# Patient Record
Sex: MALE | Race: WHITE | NOT HISPANIC OR LATINO | Employment: OTHER | URBAN - METROPOLITAN AREA
[De-identification: names, ages, dates, MRNs, and addresses within clinical notes are randomized per-mention and may not be internally consistent; named-entity substitution may affect disease eponyms.]

---

## 2024-06-21 ENCOUNTER — PRE-ADMISSION TESTING (OUTPATIENT)
Dept: PREADMISSION TESTING | Facility: HOSPITAL | Age: 69
End: 2024-06-21
Payer: MEDICARE

## 2024-06-21 VITALS — HEIGHT: 70 IN | WEIGHT: 212.7 LBS | BODY MASS INDEX: 30.45 KG/M2

## 2024-06-21 LAB
ALBUMIN SERPL-MCNC: 4.4 G/DL (ref 3.5–5.2)
ALBUMIN/GLOB SERPL: 1.6 G/DL
ALP SERPL-CCNC: 48 U/L (ref 39–117)
ALT SERPL W P-5'-P-CCNC: 18 U/L (ref 1–41)
ANION GAP SERPL CALCULATED.3IONS-SCNC: 9 MMOL/L (ref 5–15)
APTT PPP: 27.6 SECONDS (ref 22–39)
AST SERPL-CCNC: 28 U/L (ref 1–40)
BASOPHILS # BLD AUTO: 0.03 10*3/MM3 (ref 0–0.2)
BASOPHILS NFR BLD AUTO: 0.4 % (ref 0–1.5)
BILIRUB SERPL-MCNC: 1.1 MG/DL (ref 0–1.2)
BUN SERPL-MCNC: 13 MG/DL (ref 8–23)
BUN/CREAT SERPL: 14.6 (ref 7–25)
CALCIUM SPEC-SCNC: 9.4 MG/DL (ref 8.6–10.5)
CHLORIDE SERPL-SCNC: 103 MMOL/L (ref 98–107)
CO2 SERPL-SCNC: 28 MMOL/L (ref 22–29)
CREAT SERPL-MCNC: 0.89 MG/DL (ref 0.76–1.27)
DEPRECATED RDW RBC AUTO: 44.4 FL (ref 37–54)
EGFRCR SERPLBLD CKD-EPI 2021: 93.3 ML/MIN/1.73
EOSINOPHIL # BLD AUTO: 0.08 10*3/MM3 (ref 0–0.4)
EOSINOPHIL NFR BLD AUTO: 1 % (ref 0.3–6.2)
ERYTHROCYTE [DISTWIDTH] IN BLOOD BY AUTOMATED COUNT: 12.8 % (ref 12.3–15.4)
GLOBULIN UR ELPH-MCNC: 2.8 GM/DL
GLUCOSE SERPL-MCNC: 88 MG/DL (ref 65–99)
HBA1C MFR BLD: 5.6 % (ref 4.8–5.6)
HCT VFR BLD AUTO: 44.5 % (ref 37.5–51)
HGB BLD-MCNC: 14.3 G/DL (ref 13–17.7)
IMM GRANULOCYTES # BLD AUTO: 0.01 10*3/MM3 (ref 0–0.05)
IMM GRANULOCYTES NFR BLD AUTO: 0.1 % (ref 0–0.5)
INR PPP: 0.99 (ref 0.89–1.12)
LYMPHOCYTES # BLD AUTO: 2.06 10*3/MM3 (ref 0.7–3.1)
LYMPHOCYTES NFR BLD AUTO: 26.8 % (ref 19.6–45.3)
MCH RBC QN AUTO: 30.4 PG (ref 26.6–33)
MCHC RBC AUTO-ENTMCNC: 32.1 G/DL (ref 31.5–35.7)
MCV RBC AUTO: 94.7 FL (ref 79–97)
MONOCYTES # BLD AUTO: 0.72 10*3/MM3 (ref 0.1–0.9)
MONOCYTES NFR BLD AUTO: 9.4 % (ref 5–12)
NEUTROPHILS NFR BLD AUTO: 4.79 10*3/MM3 (ref 1.7–7)
NEUTROPHILS NFR BLD AUTO: 62.3 % (ref 42.7–76)
NRBC BLD AUTO-RTO: 0 /100 WBC (ref 0–0.2)
PLATELET # BLD AUTO: 255 10*3/MM3 (ref 140–450)
PMV BLD AUTO: 11 FL (ref 6–12)
POTASSIUM SERPL-SCNC: 4.2 MMOL/L (ref 3.5–5.2)
PROT SERPL-MCNC: 7.2 G/DL (ref 6–8.5)
PROTHROMBIN TIME: 13.2 SECONDS (ref 12.2–14.5)
QT INTERVAL: 388 MS
QTC INTERVAL: 393 MS
RBC # BLD AUTO: 4.7 10*6/MM3 (ref 4.14–5.8)
SODIUM SERPL-SCNC: 140 MMOL/L (ref 136–145)
WBC NRBC COR # BLD AUTO: 7.69 10*3/MM3 (ref 3.4–10.8)

## 2024-06-21 PROCEDURE — 85730 THROMBOPLASTIN TIME PARTIAL: CPT

## 2024-06-21 PROCEDURE — 85025 COMPLETE CBC W/AUTO DIFF WBC: CPT

## 2024-06-21 PROCEDURE — 36415 COLL VENOUS BLD VENIPUNCTURE: CPT

## 2024-06-21 PROCEDURE — 85610 PROTHROMBIN TIME: CPT

## 2024-06-21 PROCEDURE — 83036 HEMOGLOBIN GLYCOSYLATED A1C: CPT

## 2024-06-21 PROCEDURE — 80053 COMPREHEN METABOLIC PANEL: CPT

## 2024-06-21 PROCEDURE — 93010 ELECTROCARDIOGRAM REPORT: CPT | Performed by: INTERNAL MEDICINE

## 2024-06-21 PROCEDURE — 93005 ELECTROCARDIOGRAM TRACING: CPT

## 2024-06-21 RX ORDER — LISINOPRIL 20 MG/1
20 TABLET ORAL DAILY
COMMUNITY
Start: 2015-05-19

## 2024-06-21 RX ORDER — ATORVASTATIN CALCIUM 10 MG/1
10 TABLET, FILM COATED ORAL DAILY
COMMUNITY
Start: 2024-02-27

## 2024-06-21 RX ORDER — ASPIRIN 81 MG/1
TABLET ORAL
COMMUNITY
Start: 2015-05-19

## 2024-06-21 RX ORDER — ALBUTEROL SULFATE 90 UG/1
2 AEROSOL, METERED RESPIRATORY (INHALATION) EVERY 4 HOURS PRN
COMMUNITY
Start: 2024-02-27 | End: 2025-02-26

## 2024-06-21 RX ORDER — MULTIPLE VITAMINS W/ MINERALS TAB 9MG-400MCG
1 TAB ORAL DAILY
COMMUNITY

## 2024-06-21 NOTE — PAT
An arrival time for procedure was not provided during PAT visit. If patient had any questions or concerns about their arrival time, they were instructed to contact their surgeon/physician.  Additionally, if the patient referred to an arrival time that was acquired from their my chart account, patient was encouraged to verify that time with their surgeon/physician. Arrival times are NOT provided in Pre Admission Testing Department.    Patient viewed general PAT education video as instructed in their preoperative information received from their surgeon.  Patient stated the general PAT education video was viewed in its entirety and survey completed.  Copies of PAT general education handouts (Incentive Spirometry, Meds to Beds Program, Patient Belongings, Pre-op skin preparation instructions, Blood Glucose testing, Visitor policy, Surgery FAQ, Code H) distributed to patient if not printed. Education related to the PAT pass and skin preparation for surgery (if applicable) completed in PAT as a reinforcement to PAT education video. Patient instructed to return PAT pass provided today as well as completed skin preparation sheet (if applicable) on the day of procedure.     Additionally if patient had not viewed video yet but intended to view it at home or in our waiting area, then referred them to the handout with QR code/link provided during PAT visit.  Instructed patient to complete survey after viewing the video in its entirety.  Encouraged patient/family to read PAT general education handouts thoroughly and notify PAT staff with any questions or concerns. Patient verbalized understanding of all information and priority content.    Per Anesthesia Request, patient instructed not to take their ACE/ARB medications on the AM of surgery.    Patient's surgeon called in a prescription for Benzol Peroxide 5% wash to [facility].  Patient instructed to  from [facility] pharmacy that was submitted electronically.  Verbal  and written instructions given regarding proper use of the Benzoyl Peroxide wash were provided to patient and/or paigelily during PAT visit. Patient/family also instructed to complete Benzol Peroxide checklist and return it to Pre-op on the day of surgery.  Patient and/or family verbalized understanding.      Additionally, reinforced with patient to acquire this prescription from the [facility] retail pharmacy before leaving the hospital after PAT visit due to the potential unavailability at local pharmacies.    Patient instructed to drink 20 ounces of Gatorade or Gatorlyte (if diabetic) and it needs to be completed 1 hour (for Main OR patients) or 2 hours (scheduled  section & [facility] patients) before given arrival time for procedure (NO RED Gatorade and NO Gatorade Zero).    Patient verbalized understanding.    Patient denies any current skin issues.     EKG IN CHART    [employer] (by [employer]) pain pump patient informational handout given to patient.  Instructed patient to watch [employer] Patient Education Video regarding Peripheral Nerve Catheter that will be in place for upcoming surgery unless contraindicated. The video can be accessed using QR code noted on handout.  Patient agreed to watch video.  Stressed to patient to call [facility]  if patient has any questions or concerns after discharge.

## 2024-06-27 ENCOUNTER — ANESTHESIA EVENT (OUTPATIENT)
Dept: PERIOP | Facility: HOSPITAL | Age: 69
End: 2024-06-27
Payer: MEDICARE

## 2024-06-27 RX ORDER — FAMOTIDINE 10 MG/ML
20 INJECTION, SOLUTION INTRAVENOUS ONCE
Status: CANCELLED | OUTPATIENT
Start: 2024-06-27 | End: 2024-06-27

## 2024-06-28 ENCOUNTER — APPOINTMENT (OUTPATIENT)
Dept: GENERAL RADIOLOGY | Facility: HOSPITAL | Age: 69
End: 2024-06-28
Payer: MEDICARE

## 2024-06-28 ENCOUNTER — ANESTHESIA EVENT CONVERTED (OUTPATIENT)
Dept: ANESTHESIOLOGY | Facility: HOSPITAL | Age: 69
End: 2024-06-28
Payer: MEDICARE

## 2024-06-28 ENCOUNTER — HOSPITAL ENCOUNTER (OUTPATIENT)
Facility: HOSPITAL | Age: 69
Discharge: HOME | End: 2024-06-30
Attending: ORTHOPAEDIC SURGERY | Admitting: ORTHOPAEDIC SURGERY
Payer: MEDICARE

## 2024-06-28 ENCOUNTER — ANESTHESIA (OUTPATIENT)
Dept: PERIOP | Facility: HOSPITAL | Age: 69
End: 2024-06-28
Payer: MEDICARE

## 2024-06-28 DIAGNOSIS — Z96.611 STATUS POST REVERSE TOTAL REPLACEMENT OF RIGHT SHOULDER: Primary | ICD-10-CM

## 2024-06-28 PROBLEM — M75.100 ROTATOR CUFF TEAR: Status: ACTIVE | Noted: 2024-06-28

## 2024-06-28 PROBLEM — I10 HTN (HYPERTENSION): Status: ACTIVE | Noted: 2024-06-28

## 2024-06-28 PROBLEM — E78.5 HYPERLIPIDEMIA: Status: ACTIVE | Noted: 2024-06-28

## 2024-06-28 PROBLEM — M25.511 RIGHT SHOULDER PAIN: Status: ACTIVE | Noted: 2024-06-28

## 2024-06-28 PROCEDURE — 25010000002 DEXAMETHASONE PER 1 MG

## 2024-06-28 PROCEDURE — C1776 JOINT DEVICE (IMPLANTABLE): HCPCS | Performed by: ORTHOPAEDIC SURGERY

## 2024-06-28 PROCEDURE — A9270 NON-COVERED ITEM OR SERVICE: HCPCS | Performed by: ORTHOPAEDIC SURGERY

## 2024-06-28 PROCEDURE — A9270 NON-COVERED ITEM OR SERVICE: HCPCS | Performed by: INTERNAL MEDICINE

## 2024-06-28 PROCEDURE — 97530 THERAPEUTIC ACTIVITIES: CPT

## 2024-06-28 PROCEDURE — 73020 X-RAY EXAM OF SHOULDER: CPT

## 2024-06-28 PROCEDURE — 25010000002 ONDANSETRON PER 1 MG

## 2024-06-28 PROCEDURE — 25010000002 PROPOFOL 10 MG/ML EMULSION

## 2024-06-28 PROCEDURE — 97166 OT EVAL MOD COMPLEX 45 MIN: CPT

## 2024-06-28 PROCEDURE — G0378 HOSPITAL OBSERVATION PER HR: HCPCS

## 2024-06-28 PROCEDURE — 63710000001 ATORVASTATIN 10 MG TABLET: Performed by: INTERNAL MEDICINE

## 2024-06-28 PROCEDURE — 25010000002 BUPIVACAINE (PF) 0.25 % SOLUTION: Performed by: NURSE ANESTHETIST, CERTIFIED REGISTERED

## 2024-06-28 PROCEDURE — 25010000002 SUGAMMADEX 200 MG/2ML SOLUTION

## 2024-06-28 PROCEDURE — 63710000001 OXYCODONE 5 MG TABLET: Performed by: ORTHOPAEDIC SURGERY

## 2024-06-28 PROCEDURE — 25010000002 FENTANYL CITRATE (PF) 100 MCG/2ML SOLUTION

## 2024-06-28 PROCEDURE — C1713 ANCHOR/SCREW BN/BN,TIS/BN: HCPCS | Performed by: ORTHOPAEDIC SURGERY

## 2024-06-28 PROCEDURE — 25010000002 DEXAMETHASONE SODIUM PHOSPHATE 10 MG/ML SOLUTION: Performed by: NURSE ANESTHETIST, CERTIFIED REGISTERED

## 2024-06-28 PROCEDURE — 63710000001 ACETAMINOPHEN 325 MG TABLET: Performed by: ORTHOPAEDIC SURGERY

## 2024-06-28 PROCEDURE — 63710000001 LISINOPRIL 20 MG TABLET: Performed by: INTERNAL MEDICINE

## 2024-06-28 PROCEDURE — 25010000002 CEFAZOLIN PER 500 MG: Performed by: ORTHOPAEDIC SURGERY

## 2024-06-28 PROCEDURE — 25010000002 ROPIVACAINE HCL-NACL 0.2-0.9 % SOLUTION: Performed by: ORTHOPAEDIC SURGERY

## 2024-06-28 PROCEDURE — 25010000002 FENTANYL CITRATE (PF) 50 MCG/ML SOLUTION: Performed by: NURSE ANESTHETIST, CERTIFIED REGISTERED

## 2024-06-28 PROCEDURE — 25810000003 LACTATED RINGERS PER 1000 ML: Performed by: ANESTHESIOLOGY

## 2024-06-28 PROCEDURE — L3670 SO ACRO/CLAV CAN WEB PRE OTS: HCPCS | Performed by: ORTHOPAEDIC SURGERY

## 2024-06-28 PROCEDURE — 25010000002 ROPIVACAINE HCL-NACL 0.2-0.9 % SOLUTION: Performed by: NURSE ANESTHETIST, CERTIFIED REGISTERED

## 2024-06-28 PROCEDURE — 25010000002 VANCOMYCIN 1 G RECONSTITUTED SOLUTION: Performed by: ORTHOPAEDIC SURGERY

## 2024-06-28 DEVICE — SCRW CENTRL GLEN UNIVERS REVERS 20MM: Type: IMPLANTABLE DEVICE | Site: SHOULDER | Status: FUNCTIONAL

## 2024-06-28 DEVICE — CUP SUT UNIVERS REVERS 39 PLS2 RT: Type: IMPLANTABLE DEVICE | Site: SHOULDER | Status: FUNCTIONAL

## 2024-06-28 DEVICE — SCRW NL GLEN UNIVERS REVERS PERIPH 4.5X36MM: Type: IMPLANTABLE DEVICE | Site: SHOULDER | Status: FUNCTIONAL

## 2024-06-28 DEVICE — GLENOSPHERE UNIVERS REVERS MODULAR L/24 39PLS4: Type: IMPLANTABLE DEVICE | Site: SHOULDER | Status: FUNCTIONAL

## 2024-06-28 DEVICE — STEM HUM/SHLDR UNIVERS REVERS APEX SZ9: Type: IMPLANTABLE DEVICE | Site: SHOULDER | Status: FUNCTIONAL

## 2024-06-28 DEVICE — IMPLANTABLE DEVICE: Type: IMPLANTABLE DEVICE | Site: SHOULDER | Status: FUNCTIONAL

## 2024-06-28 DEVICE — SCRW NL GLEN UNIVERS REVERS PERIPH 4.5X20MM: Type: IMPLANTABLE DEVICE | Site: SHOULDER | Status: FUNCTIONAL

## 2024-06-28 DEVICE — SCRW LK GLEN UNIVERS REVERS PERIPH 5.5X32MM: Type: IMPLANTABLE DEVICE | Site: SHOULDER | Status: FUNCTIONAL

## 2024-06-28 DEVICE — LINER HUM UNIVERS REVERS MD 39  PLS6MM: Type: IMPLANTABLE DEVICE | Site: SHOULDER | Status: FUNCTIONAL

## 2024-06-28 DEVICE — SCRW LK GLEN UNIVERS REVERS PERIPH 5.5X36MM: Type: IMPLANTABLE DEVICE | Site: SHOULDER | Status: FUNCTIONAL

## 2024-06-28 DEVICE — ABSORBABLE HEMOSTAT (OXIDIZED REGENERATED CELLULOSE)
Type: IMPLANTABLE DEVICE | Site: SHOULDER | Status: FUNCTIONAL
Brand: SURGICEL

## 2024-06-28 RX ORDER — ONDANSETRON 2 MG/ML
4 INJECTION INTRAMUSCULAR; INTRAVENOUS ONCE AS NEEDED
Status: DISCONTINUED | OUTPATIENT
Start: 2024-06-28 | End: 2024-06-28 | Stop reason: HOSPADM

## 2024-06-28 RX ORDER — PREGABALIN 75 MG/1
75 CAPSULE ORAL ONCE
Status: COMPLETED | OUTPATIENT
Start: 2024-06-28 | End: 2024-06-28

## 2024-06-28 RX ORDER — ACETAMINOPHEN 325 MG/1
650 TABLET ORAL EVERY 4 HOURS PRN
Status: DISCONTINUED | OUTPATIENT
Start: 2024-06-28 | End: 2024-06-30 | Stop reason: HOSPADM

## 2024-06-28 RX ORDER — SODIUM CHLORIDE 450 MG/100ML
50 INJECTION, SOLUTION INTRAVENOUS CONTINUOUS
Status: DISCONTINUED | OUTPATIENT
Start: 2024-06-28 | End: 2024-06-29

## 2024-06-28 RX ORDER — OXYCODONE HYDROCHLORIDE 5 MG/1
5 TABLET ORAL EVERY 4 HOURS PRN
Status: DISCONTINUED | OUTPATIENT
Start: 2024-06-28 | End: 2024-06-30 | Stop reason: HOSPADM

## 2024-06-28 RX ORDER — FAMOTIDINE 20 MG/1
20 TABLET, FILM COATED ORAL ONCE
Status: COMPLETED | OUTPATIENT
Start: 2024-06-28 | End: 2024-06-28

## 2024-06-28 RX ORDER — SODIUM CHLORIDE 9 MG/ML
40 INJECTION, SOLUTION INTRAVENOUS AS NEEDED
Status: DISCONTINUED | OUTPATIENT
Start: 2024-06-28 | End: 2024-06-28 | Stop reason: HOSPADM

## 2024-06-28 RX ORDER — CEFAZOLIN SODIUM 2 G/100ML
2000 INJECTION, SOLUTION INTRAVENOUS EVERY 8 HOURS
Qty: 200 ML | Refills: 0 | Status: DISCONTINUED | OUTPATIENT
Start: 2024-06-28 | End: 2024-06-28

## 2024-06-28 RX ORDER — ALBUTEROL SULFATE 2.5 MG/3ML
2.5 SOLUTION RESPIRATORY (INHALATION) EVERY 6 HOURS PRN
Status: DISCONTINUED | OUTPATIENT
Start: 2024-06-28 | End: 2024-06-30 | Stop reason: HOSPADM

## 2024-06-28 RX ORDER — FENTANYL CITRATE 50 UG/ML
INJECTION, SOLUTION INTRAMUSCULAR; INTRAVENOUS AS NEEDED
Status: DISCONTINUED | OUTPATIENT
Start: 2024-06-28 | End: 2024-06-28 | Stop reason: SURG

## 2024-06-28 RX ORDER — ONDANSETRON 2 MG/ML
INJECTION INTRAMUSCULAR; INTRAVENOUS AS NEEDED
Status: DISCONTINUED | OUTPATIENT
Start: 2024-06-28 | End: 2024-06-28 | Stop reason: SURG

## 2024-06-28 RX ORDER — ROPIVACAINE HYDROCHLORIDE 2 MG/ML
INJECTION, SOLUTION EPIDURAL; INFILTRATION; PERINEURAL CONTINUOUS
Status: DISCONTINUED | OUTPATIENT
Start: 2024-06-28 | End: 2024-06-30 | Stop reason: HOSPADM

## 2024-06-28 RX ORDER — ACETAMINOPHEN 500 MG
1000 TABLET ORAL ONCE
Status: COMPLETED | OUTPATIENT
Start: 2024-06-28 | End: 2024-06-28

## 2024-06-28 RX ORDER — SODIUM CHLORIDE 0.9 % (FLUSH) 0.9 %
3-10 SYRINGE (ML) INJECTION AS NEEDED
Status: DISCONTINUED | OUTPATIENT
Start: 2024-06-28 | End: 2024-06-28 | Stop reason: HOSPADM

## 2024-06-28 RX ORDER — DEXAMETHASONE SODIUM PHOSPHATE 4 MG/ML
INJECTION, SOLUTION INTRA-ARTICULAR; INTRALESIONAL; INTRAMUSCULAR; INTRAVENOUS; SOFT TISSUE AS NEEDED
Status: DISCONTINUED | OUTPATIENT
Start: 2024-06-28 | End: 2024-06-28 | Stop reason: SURG

## 2024-06-28 RX ORDER — PHENYLEPHRINE HCL IN 0.9% NACL 1 MG/10 ML
SYRINGE (ML) INTRAVENOUS AS NEEDED
Status: DISCONTINUED | OUTPATIENT
Start: 2024-06-28 | End: 2024-06-28 | Stop reason: SURG

## 2024-06-28 RX ORDER — TRANEXAMIC ACID 10 MG/ML
1000 INJECTION, SOLUTION INTRAVENOUS ONCE
Status: DISCONTINUED | OUTPATIENT
Start: 2024-06-28 | End: 2024-06-28 | Stop reason: HOSPADM

## 2024-06-28 RX ORDER — NALOXONE HCL 0.4 MG/ML
0.1 VIAL (ML) INJECTION
Status: DISCONTINUED | OUTPATIENT
Start: 2024-06-28 | End: 2024-06-30 | Stop reason: HOSPADM

## 2024-06-28 RX ORDER — IPRATROPIUM BROMIDE AND ALBUTEROL SULFATE 2.5; .5 MG/3ML; MG/3ML
3 SOLUTION RESPIRATORY (INHALATION) ONCE AS NEEDED
Status: DISCONTINUED | OUTPATIENT
Start: 2024-06-28 | End: 2024-06-28 | Stop reason: HOSPADM

## 2024-06-28 RX ORDER — SODIUM CHLORIDE 0.9 % (FLUSH) 0.9 %
3 SYRINGE (ML) INJECTION EVERY 12 HOURS SCHEDULED
Status: DISCONTINUED | OUTPATIENT
Start: 2024-06-28 | End: 2024-06-28 | Stop reason: HOSPADM

## 2024-06-28 RX ORDER — SODIUM CHLORIDE 0.9 % (FLUSH) 0.9 %
10 SYRINGE (ML) INJECTION EVERY 12 HOURS SCHEDULED
Status: DISCONTINUED | OUTPATIENT
Start: 2024-06-28 | End: 2024-06-28 | Stop reason: HOSPADM

## 2024-06-28 RX ORDER — DEXAMETHASONE SODIUM PHOSPHATE 10 MG/ML
INJECTION, SOLUTION INTRAMUSCULAR; INTRAVENOUS
Status: COMPLETED | OUTPATIENT
Start: 2024-06-28 | End: 2024-06-28

## 2024-06-28 RX ORDER — PROPOFOL 10 MG/ML
VIAL (ML) INTRAVENOUS AS NEEDED
Status: DISCONTINUED | OUTPATIENT
Start: 2024-06-28 | End: 2024-06-28 | Stop reason: SURG

## 2024-06-28 RX ORDER — LIDOCAINE HYDROCHLORIDE 10 MG/ML
INJECTION, SOLUTION EPIDURAL; INFILTRATION; INTRACAUDAL; PERINEURAL AS NEEDED
Status: DISCONTINUED | OUTPATIENT
Start: 2024-06-28 | End: 2024-06-28 | Stop reason: SURG

## 2024-06-28 RX ORDER — ROCURONIUM BROMIDE 10 MG/ML
INJECTION, SOLUTION INTRAVENOUS AS NEEDED
Status: DISCONTINUED | OUTPATIENT
Start: 2024-06-28 | End: 2024-06-28 | Stop reason: SURG

## 2024-06-28 RX ORDER — EPHEDRINE SULFATE 50 MG/ML
INJECTION INTRAVENOUS AS NEEDED
Status: DISCONTINUED | OUTPATIENT
Start: 2024-06-28 | End: 2024-06-28 | Stop reason: SURG

## 2024-06-28 RX ORDER — HYDRALAZINE HYDROCHLORIDE 20 MG/ML
5 INJECTION INTRAMUSCULAR; INTRAVENOUS
Status: DISCONTINUED | OUTPATIENT
Start: 2024-06-28 | End: 2024-06-28 | Stop reason: HOSPADM

## 2024-06-28 RX ORDER — ONDANSETRON 4 MG/1
4 TABLET, ORALLY DISINTEGRATING ORAL EVERY 6 HOURS PRN
Status: DISCONTINUED | OUTPATIENT
Start: 2024-06-28 | End: 2024-06-30 | Stop reason: HOSPADM

## 2024-06-28 RX ORDER — ONDANSETRON 2 MG/ML
4 INJECTION INTRAMUSCULAR; INTRAVENOUS EVERY 6 HOURS PRN
Status: DISCONTINUED | OUTPATIENT
Start: 2024-06-28 | End: 2024-06-30 | Stop reason: HOSPADM

## 2024-06-28 RX ORDER — BUPIVACAINE HYDROCHLORIDE 2.5 MG/ML
INJECTION, SOLUTION EPIDURAL; INFILTRATION; INTRACAUDAL
Status: COMPLETED | OUTPATIENT
Start: 2024-06-28 | End: 2024-06-28

## 2024-06-28 RX ORDER — VANCOMYCIN HYDROCHLORIDE 1 G/20ML
INJECTION, POWDER, LYOPHILIZED, FOR SOLUTION INTRAVENOUS AS NEEDED
Status: DISCONTINUED | OUTPATIENT
Start: 2024-06-28 | End: 2024-06-28 | Stop reason: HOSPADM

## 2024-06-28 RX ORDER — ACETAMINOPHEN 650 MG/1
650 SUPPOSITORY RECTAL EVERY 4 HOURS PRN
Status: DISCONTINUED | OUTPATIENT
Start: 2024-06-28 | End: 2024-06-30 | Stop reason: HOSPADM

## 2024-06-28 RX ORDER — LISINOPRIL 20 MG/1
20 TABLET ORAL DAILY
Status: DISCONTINUED | OUTPATIENT
Start: 2024-06-28 | End: 2024-06-29

## 2024-06-28 RX ORDER — SODIUM CHLORIDE, SODIUM LACTATE, POTASSIUM CHLORIDE, CALCIUM CHLORIDE 600; 310; 30; 20 MG/100ML; MG/100ML; MG/100ML; MG/100ML
9 INJECTION, SOLUTION INTRAVENOUS CONTINUOUS
Status: DISCONTINUED | OUTPATIENT
Start: 2024-06-28 | End: 2024-06-28

## 2024-06-28 RX ORDER — DROPERIDOL 2.5 MG/ML
0.62 INJECTION, SOLUTION INTRAMUSCULAR; INTRAVENOUS
Status: DISCONTINUED | OUTPATIENT
Start: 2024-06-28 | End: 2024-06-28 | Stop reason: HOSPADM

## 2024-06-28 RX ORDER — TRANEXAMIC ACID 10 MG/ML
1000 INJECTION, SOLUTION INTRAVENOUS ONCE
Status: COMPLETED | OUTPATIENT
Start: 2024-06-28 | End: 2024-06-28

## 2024-06-28 RX ORDER — LABETALOL HYDROCHLORIDE 5 MG/ML
5 INJECTION, SOLUTION INTRAVENOUS
Status: DISCONTINUED | OUTPATIENT
Start: 2024-06-28 | End: 2024-06-28 | Stop reason: HOSPADM

## 2024-06-28 RX ORDER — FENTANYL CITRATE 50 UG/ML
INJECTION, SOLUTION INTRAMUSCULAR; INTRAVENOUS
Status: COMPLETED | OUTPATIENT
Start: 2024-06-28 | End: 2024-06-28

## 2024-06-28 RX ORDER — HYDROMORPHONE HYDROCHLORIDE 1 MG/ML
0.5 INJECTION, SOLUTION INTRAMUSCULAR; INTRAVENOUS; SUBCUTANEOUS
Status: DISCONTINUED | OUTPATIENT
Start: 2024-06-28 | End: 2024-06-28 | Stop reason: SDUPTHER

## 2024-06-28 RX ORDER — ATORVASTATIN CALCIUM 10 MG/1
10 TABLET, FILM COATED ORAL NIGHTLY
Status: DISCONTINUED | OUTPATIENT
Start: 2024-06-28 | End: 2024-06-30 | Stop reason: HOSPADM

## 2024-06-28 RX ORDER — MIDAZOLAM HYDROCHLORIDE 1 MG/ML
0.5 INJECTION INTRAMUSCULAR; INTRAVENOUS
Status: DISCONTINUED | OUTPATIENT
Start: 2024-06-28 | End: 2024-06-28 | Stop reason: HOSPADM

## 2024-06-28 RX ORDER — LABETALOL HYDROCHLORIDE 5 MG/ML
10 INJECTION, SOLUTION INTRAVENOUS EVERY 4 HOURS PRN
Status: DISCONTINUED | OUTPATIENT
Start: 2024-06-28 | End: 2024-06-30 | Stop reason: HOSPADM

## 2024-06-28 RX ORDER — FENTANYL CITRATE 50 UG/ML
50 INJECTION, SOLUTION INTRAMUSCULAR; INTRAVENOUS
Status: DISCONTINUED | OUTPATIENT
Start: 2024-06-28 | End: 2024-06-28 | Stop reason: HOSPADM

## 2024-06-28 RX ORDER — LIDOCAINE HYDROCHLORIDE 10 MG/ML
0.5 INJECTION, SOLUTION EPIDURAL; INFILTRATION; INTRACAUDAL; PERINEURAL ONCE AS NEEDED
Status: COMPLETED | OUTPATIENT
Start: 2024-06-28 | End: 2024-06-28

## 2024-06-28 RX ORDER — SODIUM CHLORIDE 0.9 % (FLUSH) 0.9 %
10 SYRINGE (ML) INJECTION AS NEEDED
Status: DISCONTINUED | OUTPATIENT
Start: 2024-06-28 | End: 2024-06-28 | Stop reason: HOSPADM

## 2024-06-28 RX ORDER — NALOXONE HCL 0.4 MG/ML
0.4 VIAL (ML) INJECTION AS NEEDED
Status: DISCONTINUED | OUTPATIENT
Start: 2024-06-28 | End: 2024-06-28 | Stop reason: HOSPADM

## 2024-06-28 RX ORDER — DROPERIDOL 2.5 MG/ML
0.62 INJECTION, SOLUTION INTRAMUSCULAR; INTRAVENOUS ONCE AS NEEDED
Status: DISCONTINUED | OUTPATIENT
Start: 2024-06-28 | End: 2024-06-28 | Stop reason: HOSPADM

## 2024-06-28 RX ADMIN — EPHEDRINE SULFATE 10 MG: 50 INJECTION INTRAVENOUS at 10:28

## 2024-06-28 RX ADMIN — FAMOTIDINE 20 MG: 20 TABLET, FILM COATED ORAL at 08:25

## 2024-06-28 RX ADMIN — Medication 100 MCG: at 10:19

## 2024-06-28 RX ADMIN — SODIUM CHLORIDE, POTASSIUM CHLORIDE, SODIUM LACTATE AND CALCIUM CHLORIDE 9 ML/HR: 600; 310; 30; 20 INJECTION, SOLUTION INTRAVENOUS at 08:35

## 2024-06-28 RX ADMIN — BUPIVACAINE HYDROCHLORIDE 15 ML: 2.5 INJECTION, SOLUTION EPIDURAL; INFILTRATION; INTRACAUDAL; PERINEURAL at 09:06

## 2024-06-28 RX ADMIN — ATORVASTATIN CALCIUM 10 MG: 10 TABLET, FILM COATED ORAL at 20:37

## 2024-06-28 RX ADMIN — SUGAMMADEX 200 MG: 100 INJECTION, SOLUTION INTRAVENOUS at 11:17

## 2024-06-28 RX ADMIN — SODIUM CHLORIDE 2000 MG: 900 INJECTION INTRAVENOUS at 10:20

## 2024-06-28 RX ADMIN — LIDOCAINE HYDROCHLORIDE 100 MG: 10 INJECTION, SOLUTION EPIDURAL; INFILTRATION; INTRACAUDAL; PERINEURAL at 10:12

## 2024-06-28 RX ADMIN — PROPOFOL 30 MG: 10 INJECTION, EMULSION INTRAVENOUS at 11:22

## 2024-06-28 RX ADMIN — PROPOFOL 200 MG: 10 INJECTION, EMULSION INTRAVENOUS at 10:12

## 2024-06-28 RX ADMIN — ACETAMINOPHEN 650 MG: 325 TABLET ORAL at 15:15

## 2024-06-28 RX ADMIN — TRANEXAMIC ACID 1000 MG: 10 INJECTION, SOLUTION INTRAVENOUS at 11:09

## 2024-06-28 RX ADMIN — ONDANSETRON 4 MG: 2 INJECTION INTRAMUSCULAR; INTRAVENOUS at 11:09

## 2024-06-28 RX ADMIN — OXYCODONE HYDROCHLORIDE 5 MG: 5 TABLET ORAL at 20:37

## 2024-06-28 RX ADMIN — ACETAMINOPHEN 1000 MG: 500 TABLET ORAL at 08:25

## 2024-06-28 RX ADMIN — FENTANYL CITRATE 100 MCG: 50 INJECTION, SOLUTION INTRAMUSCULAR; INTRAVENOUS at 10:12

## 2024-06-28 RX ADMIN — PREGABALIN 75 MG: 75 CAPSULE ORAL at 08:25

## 2024-06-28 RX ADMIN — LISINOPRIL 20 MG: 20 TABLET ORAL at 15:15

## 2024-06-28 RX ADMIN — EPHEDRINE SULFATE 10 MG: 50 INJECTION INTRAVENOUS at 10:24

## 2024-06-28 RX ADMIN — LIDOCAINE HYDROCHLORIDE 0.5 ML: 10 INJECTION, SOLUTION EPIDURAL; INFILTRATION; INTRACAUDAL; PERINEURAL at 08:25

## 2024-06-28 RX ADMIN — SODIUM CHLORIDE 2000 MG: 900 INJECTION INTRAVENOUS at 18:26

## 2024-06-28 RX ADMIN — DEXAMETHASONE SODIUM PHOSPHATE 2 MG: 10 INJECTION, SOLUTION INTRAMUSCULAR; INTRAVENOUS at 09:10

## 2024-06-28 RX ADMIN — TRANEXAMIC ACID 1000 MG: 10 INJECTION, SOLUTION INTRAVENOUS at 10:21

## 2024-06-28 RX ADMIN — ROCURONIUM BROMIDE 80 MG: 10 INJECTION INTRAVENOUS at 10:13

## 2024-06-28 RX ADMIN — SODIUM CHLORIDE 50 ML/HR: 4.5 INJECTION, SOLUTION INTRAVENOUS at 15:16

## 2024-06-28 RX ADMIN — Medication 1000 MG: at 11:45

## 2024-06-28 RX ADMIN — Medication 100 MCG: at 10:58

## 2024-06-28 RX ADMIN — EPHEDRINE SULFATE 10 MG: 50 INJECTION INTRAVENOUS at 10:19

## 2024-06-28 RX ADMIN — ACETAMINOPHEN 650 MG: 325 TABLET ORAL at 20:37

## 2024-06-28 RX ADMIN — Medication 100 MCG: at 10:49

## 2024-06-28 RX ADMIN — FENTANYL CITRATE 100 MCG: 50 INJECTION, SOLUTION INTRAMUSCULAR; INTRAVENOUS at 08:59

## 2024-06-28 RX ADMIN — SODIUM CHLORIDE, POTASSIUM CHLORIDE, SODIUM LACTATE AND CALCIUM CHLORIDE: 600; 310; 30; 20 INJECTION, SOLUTION INTRAVENOUS at 11:23

## 2024-06-28 RX ADMIN — BUPIVACAINE HYDROCHLORIDE 30 ML: 2.5 INJECTION, SOLUTION EPIDURAL; INFILTRATION; INTRACAUDAL at 09:10

## 2024-06-28 RX ADMIN — DEXAMETHASONE SODIUM PHOSPHATE 4 MG: 4 INJECTION INTRA-ARTICULAR; INTRALESIONAL; INTRAMUSCULAR; INTRAVENOUS; SOFT TISSUE at 10:17

## 2024-06-28 NOTE — ANESTHESIA PROCEDURE NOTES
Airway  Urgency: elective    Date/Time: 6/28/2024 10:14 AM  Airway not difficult    General Information and Staff    Patient location during procedure: OR  CRNA/CAA: Gary Zambrano CRNA    Indications and Patient Condition  Indications for airway management: airway protection    Preoxygenated: yes  MILS not maintained throughout  Mask difficulty assessment: 2 - vent by mask + OA or adjuvant +/- NMBA    Final Airway Details  Final airway type: endotracheal airway      Successful airway: ETT  Cuffed: yes   Successful intubation technique: video laryngoscopy  Endotracheal tube insertion site: oral  Blade: Amina  Blade size: 3  ETT size (mm): 7.5  Cormack-Lehane Classification: grade I - full view of glottis  Placement verified by: chest auscultation and capnometry   Cuff volume (mL): 10  Measured from: lips  ETT/EBT  to lips (cm): 21  Number of attempts at approach: 1  Assessment: lips, teeth, and gum same as pre-op and atraumatic intubation    Additional Comments  Negative epigastric sounds, Breath sound equal bilaterally with symmetric chest rise and fall

## 2024-06-28 NOTE — THERAPY EVALUATION
Patient Name: George Pickard  : 1955    MRN: [id number]                              Today's Date: 2024       Admit Date: 2024    Visit Dx: No diagnosis found.  Patient Active Problem List   Diagnosis    Rotator cuff tear    Right shoulder pain    HTN (hypertension)    Hyperlipidemia    Status post reverse total replacement of right shoulder     Past Medical History:   Diagnosis Date    Arthritis     Hyperlipidemia     Hypertension     ROGE (obstructive sleep apnea)     does not use cpap     Past Surgical History:   Procedure Laterality Date    COLECTOMY PARTIAL / TOTAL      COLONOSCOPY      ENDOSCOPY      ROTATOR CUFF REPAIR Bilateral     R-, L-      General Information       Row Name 24 1523          OT Time and Intention    Document Type evaluation;therapy note (daily note)  -TB     Mode of Treatment occupational therapy;individual therapy  -TB       Row Name 24 1523          General Information    Patient Profile Reviewed yes  -TB     Prior Level of Function independent:;all household mobility;community mobility;ADL's;driving;shopping;using stairs  No AD or falls  -TB     Existing Precautions/Restrictions right;shoulder;non-weight bearing;other (see comments)  s/p R rTSA with NWB precautions, Sling with abduction pillow, IS nerve catheter  -TB     Barriers to Rehab none identified  -TB       Row Name 24 1523          Occupational Profile    Reason for Services/Referral (Occupational Profile) Occupational decline  -TB     Environmental Supports and Barriers (Occupational Profile) Pt lives with Daughter and grandchildren in a double wide trailer with 3-4 steps to enter, rails on both sides. Walk-in shower. Comfort ht commodes. No AD or falls prior. Pt reports Independent with BADLs at baseline.  -TB       Row Name 24 1523          Living Environment    People in Home child(judy), adult;grandchild(judy)  -TB       Row Name 24 1523          Home Main  Entrance    Number of Stairs, Main Entrance four  -TB     Stair Railings, Main Entrance railings safe and in good condition  -TB       Row Name 06/28/24 1523          Stairs Within Home, Primary    Number of Stairs, Within Home, Primary none  -TB       Row Name 06/28/24 1523          Cognition    Orientation Status (Cognition) oriented x 4  -TB       Row Name 06/28/24 1523          Safety Issues, Functional Mobility    Safety Issues Affecting Function (Mobility) insight into deficits/self-awareness;awareness of need for assistance;safety precaution awareness;safety precautions follow-through/compliance;sequencing abilities  -TB     Impairments Affecting Function (Mobility) strength;sensation/sensory awareness;range of motion (ROM)  -TB     Comment, Safety Issues/Impairments (Mobility) Pt passed the OT mobility screen. Up in room and hallway with L HHA, progressing to SBA. Able to ascend/descend 4 steps with CGA. RA sats stable >90% on exertion. No PT needed.  -TB               User Key  (r) = Recorded By, (t) = Taken By, (c) = Cosigned By      Initials Name Provider Type    TB Diana Delarosa, OT Occupational Therapist                     Mobility/ADL's       Row Name 06/28/24 1531          Bed Mobility    Bed Mobility supine-sit;scooting/bridging  -TB     Scooting/Bridging [place] (Bed Mobility) standby assist;verbal cues  -TB     Supine-Sit [place] (Bed Mobility) standby assist;verbal cues  -TB     Bed Mobility, Safety Issues decreased use of arms for pushing/pulling  -TB     Assistive Device (Bed Mobility) head of bed elevated;bed rails  -TB     Comment, (Bed Mobility) Pt able to transition to EOB sitting with cues. Education and Assist to manage nerve catheter. Pt able to maintain LUE NWB precautions.  -TB       Row Name 06/28/24 1531          Transfers    Transfers sit-stand transfer;stand-sit transfer;bed-chair transfer  -TB     Comment, (Transfers) Education and cues for hand placement.  Assist to manage nerve catheter.  -TB       Row Name 06/28/24 1531          Bed-Chair Transfer    Bed-Chair [place] (Transfers) standby assist;verbal cues  -TB       Row Name 06/28/24 1531          Sit-Stand Transfer    Sit-Stand [place] (Transfers) standby assist;verbal cues  -TB       Row Name 06/28/24 1531          Stand-Sit Transfer    Stand-Sit [place] (Transfers) standby assist;verbal cues  -TB       Row Name 06/28/24 1531          Functional Mobility    Functional Mobility- Ind. Level contact guard assist;standby assist  -TB     Functional Mobility-Distance (Feet) 350  -TB     Functional Mobility-Maintain WBing able to maintain weight bearing status  -TB     Functional Mobility- Comment Pt passed the OT mobility screen. Up in room and hallway with L HHA, progressing to SBA. Able to ascend/descend 4 steps with CGA. RA sats stable >90% on exertion. No PT needed.  -TB     Patient was able to Ambulate yes  -       Row Name 06/28/24 1531          Activities of Daily Living    BADL Assessment/Intervention bathing;upper body dressing;feeding  -       Row Name 06/28/24 1531          Mobility    Extremity Weight-bearing Status right upper extremity  -TB     Right Upper Extremity (Weight-bearing Status) non weight-bearing (NWB)   -       Row Name 06/28/24 1531          Bathing Assessment/Intervention    [place] Level (Bathing) maximum assist (25% patient effort);upper body;bathing skills  -TB     Position (Bathing) edge of bed sitting  -TB     Comment, (Bathing) Patient educated on shoulder precautions with axilla care. Patient educated that nerve catheter cannot get wet. Pt's brother present for teaching.  -TB       Row Name 06/28/24 1531          Upper Body Dressing Assessment/Training    [place] Level (Upper Body Dressing) don;front opening garment;maximum assist (25% patient effort);verbal cues  sling mgmt/proper fit  -TB     Position (Upper Body Dressing) edge of bed sitting  -TB      Comment, (Upper Body Dressing) Patient educated on shoulder precautions with ADL retraining and sling management. Patient educated on nerve catheter care to avoid dislodgement. Pt's brother present for teaching.  -       Row Name 06/28/24 1531          Self-Feeding Assessment/Training    [place] Level (Feeding) independent;finger foods;liquids to mouth  -     Position (Self-Feeding) sitting up in bed  -               User Key  (r) = Recorded By, (t) = Taken By, (c) = Cosigned By      Initials Name Provider Type     Diana Delarosa, OT Occupational Therapist                   Obj/Interventions       Row Name 06/28/24 1535          Sensory Assessment (Somatosensory)    Sensory Assessment (Somatosensory) right UE  -     Right UE Sensory Assessment general sensation;impaired  -     Sensory Subjective Reports numbness  -     Sensory Assessment RUE IS nerve catheter infusing  -       Row Name 06/28/24 1535          Sensory Interventions    Sensory Re-education (Sensation) visual observation of sensory input  -[facility] Name 06/28/24 1535          Vision Assessment/Intervention    Visual Impairment/Limitations WFL  -[facility] Name 06/28/24 1535          Range of Motion Comprehensive    Comment, General Range of Motion h/o L RCT surgery with good recovery, AROM WFL. R hand, wrist, elbow A/AROM WFL. R shoulder deferred per precautions.  -       Row Name 06/28/24 1535          Strength Comprehensive (MMT)    Comment, General Manual Muscle Testing (MMT) Assessment Generalized weakness.  -       Row Name 06/28/24 1535          Elbow/Forearm (Therapeutic Exercise)    Elbow/Forearm (Therapeutic Exercise) PROM (passive range of motion)  -     Elbow/Forearm PROM (Therapeutic Exercise) right;flexion;extension;supination;pronation;sitting;10 repetitions  -       Row Name 06/28/24 1535          Wrist (Therapeutic Exercise)    Wrist (Therapeutic Exercise) AAROM (active assistive range of  motion)  -TB     Wrist AAROM (Therapeutic Exercise) right;flexion;extension;10 repetitions  -TB       Row Name 06/28/24 1534          Hand (Therapeutic Exercise)    Hand (Therapeutic Exercise) AROM (active range of motion)  -TB     Hand AROM/AAROM (Therapeutic Exercise) right;AROM (active range of motion);finger flexion;finger extension;10 repetitions  -TB       Row Name 06/28/24 1538          Motor Skills    Therapeutic Exercise hand;wrist;elbow/forearm  Education initiated for RUE HEP per MD parameters @ hand, wrist, and elbow. Limited by numbness. Pt's brother present for teaching.  -TB       Row Name 06/28/24 1539          Balance    Balance Assessment sitting dynamic balance;sit to stand dynamic balance;standing dynamic balance  -TB     Dynamic Sitting Balance independent  -TB     Position, Sitting Balance unsupported;sitting in chair;sitting edge of bed  -TB     Sit to Stand Dynamic Balance standby assist;verbal cues  -TB     Dynamic Standing Balance contact guard;standby assist  -TB     Position/Device Used, Standing Balance unsupported  -TB     Balance Interventions sitting;standing;sit to stand;supported;static;dynamic;occupation based/functional task;UE activity with balance activity  -TB     Comment, Balance Pt passed the OT mobility screen. Up in room and hallway with L HHA, progressing to SBA. Able to ascend/descend 4 steps with CGA. RA sats stable >90% on exertion. No PT needed.  -TB               User Key  (r) = Recorded By, (t) = Taken By, (c) = Cosigned By      Initials Name Provider Type    TB Diana Delarosa OT Occupational Therapist                   Goals/Plan       Row Name 06/28/24 9798          Transfer Goal 1 (OT)    Activity/Assistive Device (Transfer Goal 1, OT) toilet  -TB     [place] Level/Cues Needed (Transfer Goal 1, OT) standby assist;verbal cues required  -TB     Time Frame (Transfer Goal 1, OT) short term goal (STG);1 day  -TB     Progress/Outcome (Transfer Goal 1, OT)  goal ongoing  -TB       Row Name 06/28/24 1545          Dressing Goal 1 (OT)    Activity/Device (Dressing Goal 1, OT) upper body dressing  sling mgmt  -TB     [place]/Cues Needed (Dressing Goal 1, OT) moderate assist (50-74% patient effort);verbal cues required  -TB     Time Frame (Dressing Goal 1, OT) short term goal (STG);1 day  -TB     Progress/Outcome (Dressing Goal 1, OT) goal ongoing  -TB       Row Name 06/28/24 1545          ROM Goal 1 (OT)    ROM Goal 1 (OT) Pt demonstrates [place] with RUE HEP per MD parameters @ hand, wrist, and elbow only: 10 reps, 3x/day.  -TB     Time Frame (ROM Goal 1, OT) short term goal (STG);1 day  -TB     Progress/Outcome (ROM Goal 1, OT) goal ongoing  -TB               User Key  (r) = Recorded By, (t) = Taken By, (c) = Cosigned By      Initials Name Provider Type    TB Diana Delarosa, OT Occupational Therapist                   Clinical Impression       Row Name 06/28/24 1538          Pain Assessment    Pretreatment Pain Rating 0/10 - no pain  -TB     Posttreatment Pain Rating 2/10  -TB     Pain Location - Side/Orientation Right  -TB     Pain Location - other (see comments)  -TB     Pre/Posttreatment Pain Comment Axilary pain following ADL with sling mgmt and HEP  -TB     Pain Intervention(s) Ambulation/increased activity;Repositioned;Cold applied;Other (Comment)  RUE IS nerve catheter infusing.  -TB       Row Name 06/28/24 1535          Plan of Care Review    Plan of Care Reviewed With patient;sibling  -TB     Progress --  IE  -TB     Outcome Evaluation OT IE completed. Pt is A/Ox4 and participates in therapy with encouragement. Pt is fearful of pain. Pt passed the OT mobility screen. Up in room and hallway with L HHA, progressing to SBA. Able to ascend/descend 4 steps with CGA. RA sats stable >90% on exertion. No PT needed. Education initiated for R shoulder precautions, sling teaching, and ADL retraining. Max A UB bathing, dressing, and sling application. RUE  HEP completed with AROM hand, AAROM wrist, and PROM elbow. Pt reports minimal R axilla pain following activities. RN notified. OT will follow IP. Recommend home with 24/7 assist until nerve catheter is removed.  -TB       Row Name 06/28/24 1538          Therapy Assessment/Plan (OT)    Rehab Potential (OT) good, to achieve stated therapy goals  -TB     Criteria for Skilled Therapeutic Interventions Met (OT) yes;meets criteria;skilled treatment is necessary  -TB     Therapy Frequency (OT) daily  -TB       Row Name 06/28/24 1538          Therapy Plan Review/Discharge Plan (OT)    Anticipated Discharge Disposition (OT) home with 24/7 care  -TB       Row Name 06/28/24 1538          Vital Signs    Pre Systolic BP Rehab --  RN cleared OT  -TB     Pre SpO2 (%) 94  -TB     O2 Delivery Pre Treatment room air  -TB     Intra SpO2 (%) 92  -TB     O2 Delivery Intra Treatment room air  -TB     Post SpO2 (%) 93  -TB     O2 Delivery Post Treatment room air  -TB     Pre Patient Position Supine  -TB     Intra Patient Position Standing  -TB     Post Patient Position Sitting  -TB       Row Name 06/28/24 1538          Positioning and Restraints    Pre-Treatment Position in bed  -TB     Post Treatment Position chair  -TB     In Chair notified nsg;reclined;call light within reach;encouraged to call for assist;exit alarm on;with family/caregiver;legs elevated;with brace  -TB               User Key  (r) = Recorded By, (t) = Taken By, (c) = Cosigned By      Initials Name Provider Type    TB Diana Delarosa, OT Occupational Therapist                   Outcome Measures       Row Name 06/28/24 1546          How much help from another is currently needed...    Putting on and taking off regular lower body clothing? 2  -TB     Bathing (including washing, rinsing, and drying) 2  -TB     Toileting (which includes using toilet bed pan or urinal) 2  -TB     Putting on and taking off regular upper body clothing 2  -TB     Taking care of personal  grooming (such as brushing teeth) 3  -TB     Eating meals 3  -TB     AM-PAC 6 Clicks Score (OT) 14  -TB       Row Name 06/28/24 1412          How much help from another person do you currently need...    Turning from your back to your side while in flat bed without using bedrails? 3  -AP     Moving from lying on back to sitting on the side of a flat bed without bedrails? 3  -AP     Moving to and from a bed to a chair (including a wheelchair)? 3  -AP     Standing up from a chair using your arms (e.g., wheelchair, bedside chair)? 3  -AP     Climbing 3-5 steps with a railing? 3  -AP     To walk in hospital room? 3  -AP     AM-PAC 6 Clicks Score (PT) 18  -AP     Highest Level of Mobility Goal 6 --> Walk 10 steps or more  -AP       Row Name 06/28/24 1546          Functional Assessment    Outcome Measure Options AM-PAC 6 Clicks Daily Activity (OT)  -TB               User Key  (r) = Recorded By, (t) = Taken By, (c) = Cosigned By      Initials Name Provider Type    TB Diana Delarosa OT Occupational Therapist    AP Sherry Jean-Baptiste, RN Registered Nurse                    Occupational Therapy Education       Title: PT OT SLP Therapies (In Progress)       Topic: Occupational Therapy (In Progress)       Point: ADL training (Done)       Description:   Instruct learner(s) on proper safety adaptation and remediation techniques during self care or transfers.   Instruct in proper use of assistive devices.                  Learning Progress Summary             Patient Acceptance, E,D, VU,NR by TB at 6/28/2024 1546                         Point: Home exercise program (Done)       Description:   Instruct learner(s) on appropriate technique for monitoring, assisting and/or progressing therapeutic exercises/activities.                  Learning Progress Summary             Patient Acceptance, E,D, VU,NR by TB at 6/28/2024 1546                         Point: Precautions (Done)       Description:   Instruct learner(s) on  prescribed precautions during self-care and functional transfers.                  Learning Progress Summary             Patient Acceptance, E,D, VU,NR by TB at 6/28/2024 6130                         Point: Body mechanics (Not Started)       Description:   Instruct learner(s) on proper positioning and spine alignment during self-care, functional mobility activities and/or exercises.                  Learner Progress:  Not documented in this visit.                              User Key       Initials Effective Dates Name Provider Type Discipline     07/11/23 -  Diana Delarosa, OT Occupational Therapist OT                  OT Recommendation and Plan  Therapy Frequency (OT): daily  Plan of Care Review  Plan of Care Reviewed With: patient, sibling  Progress:  (IE)  Outcome Evaluation: OT IE completed. Pt is A/Ox4 and participates in therapy with encouragement. Pt is fearful of pain. Pt passed the OT mobility screen. Up in room and hallway with L HHA, progressing to SBA. Able to ascend/descend 4 steps with CGA. RA sats stable >90% on exertion. No PT needed. Education initiated for R shoulder precautions, sling teaching, and ADL retraining. Max A UB bathing, dressing, and sling application. RUE HEP completed with AROM hand, AAROM wrist, and PROM elbow. Pt reports minimal R axilla pain following activities. RN notified. OT will follow IP. Recommend home with 24/7 assist until nerve catheter is removed.     Time Calculation:   Evaluation Complexity (OT)  Review Occupational Profile/Medical/Therapy History Complexity: expanded/moderate complexity  Assessment, Occupational Performance/Identification of Deficit Complexity: 3-5 performance deficits  Clinical Decision Making Complexity (OT): detailed assessment/moderate complexity  Overall Complexity of Evaluation (OT): moderate complexity     Time Calculation- OT       Row Name 06/28/24 1429             Time Calculation- OT    OT Start Time 1423  -TB      OT Received  On 06/28/24  -TB      OT Goal Re-Cert Due Date 07/08/24  -TB         Timed Charges    28417 - OT Therapeutic Activity Minutes 45  -TB         Untimed Charges    OT Eval/Re-eval Minutes 45  -TB         Total Minutes    Timed Charges Total Minutes 45  -TB      Untimed Charges Total Minutes 45  -TB       Total Minutes 90  -TB                User Key  (r) = Recorded By, (t) = Taken By, (c) = Cosigned By      Initials Name Provider Type    TB Diana Delarosa OT Occupational Therapist                  Therapy Charges for Today       Code Description Service Date Service Provider Modifiers Qty    55044892509  OT THERAPEUTIC ACT EA 15 MIN 6/28/2024 Diana Delarosa OT GO 3    [id number] HC OT EVAL MOD COMPLEXITY 3 6/28/2024 Diana Delarosa OT GO 1                 Diana Delarosa OT  6/28/2024

## 2024-06-28 NOTE — ANESTHESIA PROCEDURE NOTES
Peripheral Block      Patient reassessed immediately prior to procedure    Patient location during procedure: OR  Start time: 6/28/2024 9:07 AM  Stop time: 6/28/2024 9:10 AM  Reason for block: at surgeon's request and post-op pain management  Performed by  CRNA/CAA: Connor Hoover CRNA  Assisted by: Sherry Rosales RN  Preanesthetic Checklist  Completed: patient identified, IV checked, site marked, risks and benefits discussed, surgical consent, monitors and equipment checked, pre-op evaluation and timeout performed  Prep:  Pt Position: left lateral decubitus  Sterile barriers:cap, gloves, mask and washed/disinfected hands  Prep: ChloraPrep  Patient monitoring: blood pressure monitoring, continuous pulse oximetry and EKG  Procedure  Performed under: general  Guidance:ultrasound guided and landmark technique  Images:still images obtained, printed/placed on chart    Laterality:right  Block Type:PECS I and PECS II  Injection Technique:single-shot  Needle Type:short-bevel  Needle Gauge:20 G  Resistance on Injection: none    Medications Used: dexamethasone sodium phosphate injection - Injection   2 mg - 6/28/2024 9:10:00 AM  bupivacaine PF (MARCAINE) 0.25 % injection - Injection   30 mL - 6/28/2024 9:10:00 AM      Medications  Preservative Free Saline:5ml    Post Assessment  Injection Assessment: negative aspiration for heme, incremental injection and no paresthesia on injection  Patient Tolerance:comfortable throughout block  Complications:no  Additional Notes  Interpectoral-Pectoserratus Plane   A high-frequency linear transducer, with sterile cover, was placed medial to the coracoid process in the paramedian sagittal plane. The transducer was moved caudally to the 4th rib and rotated slightly to allow an in-plane needle trajectory from medial to lateral. Pectoralis Major Muscle (PMM), Pectoralis Minor Muscle (PmM), Thoracoacromial Artery, Ribs, and Pleura were identified under ultrasound. The insertion site was  "prepped in sterile fashion and then localized with 2-5 ml of 1% Lidocaine. Using ultrasound-guidance, a 20-gauge B-Cedeño 4\" Ultraplex 360 non-stimulating echogenic needle was advanced in plane until the tip of the needle was in the fascial plane between the PMM and PmM, lateral to the Thoracoacromial Artery. 1-3ml of preservative free normal saline was used to hydro-dissect the fascial planes. After the fascial plane was verified, 10ml local anesthetic (LA) was injected for Interpectoral fascial plane block. The needle was continued along the same path to the level of the 4th rib below PmM.  Initially preservative free normal saline was used to confirm needle position and then 20 ml of LA was injected for Pectoserratus fascial plane block. Aspiration every 5 ml to prevent intravascular injection. Injection was completed with negative aspiration of blood and negative intravascular injection. Injection pressures were normal with minimal resistance.     Performed by: Connor Hoover, CRNA            "

## 2024-06-28 NOTE — H&P
Patient Name: George Pickard  MRN: [id number]  : 1955  DOS: 2024    Attending: Jason Cobb MD    Primary Care Provider: Daniel Elkins APRN      Chief complaint: Right shoulder pain    Subjective   Patient is a pleasant 68 y.o. male presented for scheduled surgery by Dr. Cobb.     His shoulder has been painful with limited ROM since March. He states no specific injury, but remembers having pain that radiates to his elbow that lasted more than a month. He had previous right shoulder surgery in . He denies numbness, tingling or difficulty with  to right hand.   He failed conservative management and opted to proceed with surgery.    Today he  underwent  right reverse total shoulder arthroplasty under [facility] and a block, tolerated surgery well, was admitted for further management.    Seen in his room postoperatively, doing fairly well, no complains of nausea, vomiting, or shortness of breath.          Allergies   Allergen Reactions    Morphine Itching and Rash       Meds:  Medications Prior to Admission   Medication Sig Dispense Refill Last Dose    aspirin 81 MG EC tablet Take  by mouth.   Past Month    atorvastatin (LIPITOR) 10 MG tablet Take 1 tablet by mouth Daily.   2024    Diclofenac Sodium (VOLTAREN) 1 % gel gel Apply 2 g topically to the appropriate area as directed 3 (Three) Times a Day.   Past Week    lisinopril (PRINIVIL,ZESTRIL) 20 MG tablet Take 1 tablet by mouth Daily.   2024    multivitamin with minerals (MULTIVITAMIN ADULTS PO) Take 1 tablet by mouth Daily.   Past Month    mupirocin (BACTROBAN) 2 % ointment Apply in each nostril twice daily as directed beginning 5 days before surgery 22 g 0 2024    albuterol sulfate  (90 Base) MCG/ACT inhaler Inhale 2 puffs Every 4 (Four) Hours As Needed.   More than a month    benzoyl peroxide 5 % external wash Use as directed by Dr. Cobb 142 g 0     ondansetron (ZOFRAN) 4 MG tablet Take 1 tablet by mouth  "Every 8 (Eight) Hours As Needed for post-op nausea 30 tablet 0        Past Medical History:   Diagnosis Date    Arthritis     Hyperlipidemia     Hypertension     ROGE (obstructive sleep apnea)     does not use cpap     Past Surgical History:   Procedure Laterality Date    COLECTOMY PARTIAL / TOTAL      COLONOSCOPY      ENDOSCOPY      ROTATOR CUFF REPAIR Bilateral     R-2015, L-2019     History reviewed. No pertinent family history.  Social History     Tobacco Use    Smoking status: Never    Smokeless tobacco: Never   Vaping Use    Vaping status: Never Used   Substance Use Topics    Alcohol use: Never    Drug use: Never   , retired from working in coal mines.    Review of Systems  Pertinent items are noted in HPI    Vital Signs  /64 (BP Location: Left arm, Patient Position: Lying)   Pulse 82   Temp 97.5 °F (36.4 °C) (Oral)   Resp 18   Ht 177.8 cm (70\")   Wt 97.1 kg (214 lb)   SpO2 94%   BMI 30.71 kg/m²     Physical Exam:    General Appearance:    Alert, cooperative, in no acute distress   Head:    Normocephalic, without obvious abnormality, atraumatic   Eyes:            Lids and lashes normal, conjunctivae and sclerae normal, no   icterus, no pallor, corneas clear,    Ears:    Ears appear intact with no abnormalities noted   Throat:   No oral lesions, no thrush, oral mucosa moist   Neck:   No adenopathy, supple, trachea midline, no thyromegaly         Lungs:     Clear to auscultation,respirations regular, even and                   unlabored    Heart:    Regular rhythm and normal rate, normal S1 and S2, no      murmur    Abdomen:     Normal bowel sounds, no masses, no organomegaly, soft        non-tender, non-distended, no guarding, no rebound                 tenderness   Genitalia:    Deferred   Extremities: Right UE in a sling, CDI  dressing.  Interscalene nerve block cath present.  Distal pulses, cap refill, movements of fingers, wrist, intact.     Pulses:   Pulses palpable and equal bilaterally " "  Skin:   No bleeding, bruising or rash   Neurologic:   Cranial nerves 2 - 12 grossly intact      I reviewed the patient's new clinical results.             Invalid input(s): \"NEUTOPHILPCT\"        Invalid input(s): \"LABALBU\", \"PROT\"  Lab Results   Component Value Date    HGBA1C 5.60 06/21/2024      Latest Reference Range & Units 06/21/24 11:51   Sodium 136 - 145 mmol/L 140   Potassium 3.5 - 5.2 mmol/L 4.2   Chloride 98 - 107 mmol/L 103   CO2 22.0 - 29.0 mmol/L 28.0   Anion Gap 5.0 - 15.0 mmol/L 9.0   BUN 8 - 23 mg/dL 13   Creatinine 0.76 - 1.27 mg/dL 0.89   BUN/Creatinine Ratio 7.0 - 25.0  14.6   eGFR >60.0 mL/min/1.73 93.3   Glucose 65 - 99 mg/dL 88   Calcium 8.6 - 10.5 mg/dL 9.4   Alkaline Phosphatase 39 - 117 U/L 48   Total Protein 6.0 - 8.5 g/dL 7.2   Albumin 3.5 - 5.2 g/dL 4.4   Globulin gm/dL 2.8   A/G Ratio g/dL 1.6   AST (SGOT) 1 - 40 U/L 28   ALT (SGPT) 1 - 41 U/L 18   Total Bilirubin 0.0 - 1.2 mg/dL 1.1      Latest Reference Range & Units 06/21/24 11:51   WBC 3.40 - 10.80 10*3/mm3 7.69   RBC 4.14 - 5.80 10*6/mm3 4.70   Hemoglobin 13.0 - 17.7 g/dL 14.3   Hematocrit 37.5 - 51.0 % 44.5   Platelets 140 - 450 10*3/mm3 255   RDW 12.3 - 15.4 % 12.8   MCV 79.0 - 97.0 fL 94.7   MCH 26.6 - 33.0 pg 30.4   MCHC 31.5 - 35.7 g/dL 32.1   MPV 6.0 - 12.0 fL 11.0   RDW-SD 37.0 - 54.0 fl 44.4     Assessment and Plan:       Status post reverse total replacement of right shoulder    Rotator cuff tear    Right shoulder pain    HTN (hypertension)    Hyperlipidemia      Plan    1. PT/OT. NWB, right UE, ROM hand, wrist, elbow.  2. Pain control-prns, interscalene nerve block cath with ropivacaine infusion.   3. IS-encourage  4. DVT proph- Mech/ mobilize.  5. Bowel regimen  6. Resume home medications as appropriate  7. Monitor post-op labs  8. DC planning .    - Hypertension:  Resume home medications as appropriate, formulary substitution when indicated.  Holding parameters.  Prn medications for elevated blood " pressure.    -Dyslipidemia:  Resume home regimen statin ( formulary substitution when appropriate).    -ROGE: Does not use CPAP.  Will monitor oxygenation and encourage use of incentive spirometer.      Dragon disclaimer:  Part of this encounter note is an electronic transcription/translation of spoken language to printed text. The electronic translation of spoken language may permit erroneous, or at times, nonsensical words or phrases to be inadvertently transcribed; Although I have reviewed the note for such errors, some may still exist.    Mindi Baeza MD  06/28/24  14:21 EDT

## 2024-06-28 NOTE — PLAN OF CARE
Problem: Adult Inpatient Plan of Care  Goal: Plan of Care Review  Recent Flowsheet Documentation  Taken 6/28/2024 4328 by Diana Delarosa OT  Progress: (IE) --  Plan of Care Reviewed With:   patient   sibling  Outcome Evaluation: OT IE completed. Pt is A/Ox4 and participates in therapy with encouragement. Pt is fearful of pain. Pt passed the OT mobility screen. Up in room and hallway with L HHA, progressing to SBA. Able to ascend/descend 4 steps with CGA. RA sats stable >90% on exertion. No PT needed. Education initiated for R shoulder precautions, sling teaching, and ADL retraining. Max A UB bathing, dressing, and sling application. RUE HEP completed with AROM hand, AAROM wrist, and PROM elbow. Pt reports minimal R axilla pain following activities. RN notified. OT will follow IP. Recommend home with 24/7 assist until nerve catheter is removed.

## 2024-06-28 NOTE — H&P
Pre-Op H&P  George Pickard  [id number]  1955      Chief complaint: Right shoulder pain      Subjective:  Patient is a 68 y.o.male presents for scheduled surgery by Dr. Cobb. He anticipates a REVERSE TOTAL SHOULDER ARTHROPLASTY - RIGHT  today. His shoulder has been painful with limited ROM since March. He states no specific injury, but remembers having pain that radiates to his elbow that lasted more than a month. He had previous right shoulder surgery in 2015. He denies numbness, tingling or difficulty with  to right hand.      Review of Systems:  Constitutional-- No fever, chills or sweats. No fatigue.  CV-- No chest pain, palpitation or syncope. +HTN, HLD  Resp-- No SOB, cough, hemoptysis  Skin--No rashes or lesions      Allergies:   Allergies   Allergen Reactions    Morphine Itching and Rash         Home Meds:  Medications Prior to Admission   Medication Sig Dispense Refill Last Dose    albuterol sulfate  (90 Base) MCG/ACT inhaler Inhale 2 puffs Every 4 (Four) Hours As Needed.       aspirin 81 MG EC tablet Take  by mouth.       atorvastatin (LIPITOR) 10 MG tablet Take 1 tablet by mouth Daily.       benzoyl peroxide 5 % external wash Use as directed by Dr. Cobb 142 g 0     Diclofenac Sodium (VOLTAREN) 1 % gel gel Apply 2 g topically to the appropriate area as directed 3 (Three) Times a Day.       lisinopril (PRINIVIL,ZESTRIL) 20 MG tablet Take 1 tablet by mouth Daily.       multivitamin with minerals (MULTIVITAMIN ADULTS PO) Take 1 tablet by mouth Daily.       mupirocin (BACTROBAN) 2 % ointment Apply in each nostril twice daily as directed beginning 5 days before surgery 22 g 0     ondansetron (ZOFRAN) 4 MG tablet Take 1 tablet by mouth Every 8 (Eight) Hours As Needed for post-op nausea 30 tablet 0          PMH:   Past Medical History:   Diagnosis Date    Arthritis     Hyperlipidemia     Hypertension      PSH:    Past Surgical History:   Procedure Laterality Date    COLECTOMY PARTIAL  / TOTAL      COLONOSCOPY      ENDOSCOPY      ROTATOR CUFF REPAIR Bilateral     R-2015, L-2019       Immunization History:  Influenza: UTD  Pneumococcal: UTD  Tetanus: UTD  Covid : x4    Social History:   Tobacco:   Social History     Tobacco Use   Smoking Status Never   Smokeless Tobacco Never      Alcohol:     Social History     Substance and Sexual Activity   Alcohol Use Never         Physical Exam: VS: /74  HR 72  RR 16  T 97.0  Sat 99%RA      General Appearance:    Alert, cooperative, no distress, appears stated age   Head:    Normocephalic, without obvious abnormality, atraumatic   Lungs:     Clear to auscultation bilaterally, respirations unlabored    Heart:   Regular rate and rhythm, S1 and S2 normal    Abdomen:    Soft without tenderness   Extremities:   Extremities normal, atraumatic, no cyanosis or edema   Skin:   Skin color, texture, turgor normal, no rashes or lesions   Neurologic:   Grossly intact     Results Review:     LABS:  Lab Results   Component Value Date    WBC 7.69 06/21/2024    HGB 14.3 06/21/2024    HCT 44.5 06/21/2024    MCV 94.7 06/21/2024     06/21/2024    NEUTROABS 4.79 06/21/2024    GLUCOSE 88 06/21/2024    BUN 13 06/21/2024    CREATININE 0.89 06/21/2024     06/21/2024    K 4.2 06/21/2024     06/21/2024    CO2 28.0 06/21/2024    CALCIUM 9.4 06/21/2024    ALBUMIN 4.4 06/21/2024    AST 28 06/21/2024    ALT 18 06/21/2024    BILITOT 1.1 06/21/2024       RADIOLOGY:  Imaging Results (Last 72 Hours)       ** No results found for the last 72 hours. **            I reviewed the patient's new clinical results.    Cancer Staging (if applicable)  Cancer Patient: __ yes __no __unknown; If yes, clinical stage T:__ N:__M:__, stage group or __N/A      Impression: Right shoulder pain      Plan: REVERSE TOTAL SHOULDER ARTHROPLASTY - RIGHT       Tete Castro, APRN   6/28/2024   08:13 EDT

## 2024-06-28 NOTE — ANESTHESIA POSTPROCEDURE EVALUATION
Patient: George Pickard    Procedure Summary       Date: 06/28/24 Room / Location:  [facility] [address] [place] [address] [facility]    Anesthesia Start: 1010 Anesthesia Stop: 1143    Procedure: REVERSE TOTAL SHOULDER ARTHROPLASTY - RIGHT (Right: Shoulder) Diagnosis:       Rotator cuff arthropathy of right shoulder      (Rotator cuff arthropathy)    Surgeons: Jason Cobb MD Provider: Marion Ivy MD    Anesthesia Type: general with block ASA Status: 2            Anesthesia Type: general with block    Vitals  Vitals Value Taken Time   /67 06/28/24 1143   Temp 97.4 °F (36.3 °C) 06/28/24 1143   Pulse 80 06/28/24 1143   Resp 12 06/28/24 1143   SpO2 100 % 06/28/24 1143           Post Anesthesia Care and Evaluation    Patient location during evaluation: PACU  Patient participation: complete - patient participated  Level of consciousness: awake and alert  Pain score: 0  Pain management: adequate    Airway patency: patent  Anesthetic complications: No anesthetic complications  PONV Status: none  Cardiovascular status: hemodynamically stable and acceptable  Respiratory status: nonlabored ventilation, acceptable and nasal cannula  Hydration status: acceptable

## 2024-06-28 NOTE — OP NOTE
Operative Report Reverse Total Shoulder Arthroplasty    DATE OF OPERATION: 06/28/24    PREOPERATIVE DIAGNOSIS: right shoulder rotator cuff arthropathy       POSTOPERATIVE DIAGNOSES:  1. right shoulder rotator cuff arthropathy        PROCEDURES PERFORMED:  1. right reverse total shoulder arthroplasty.        SURGEON: Jason Cobb MD      Assistant: Stephanie Oneal PA  ** Please note the physician assistant was medically necessary to assist with positioning retraction, arm positioning, care of soft tissues and closure      ANESTHESIA: General plus block.      ESTIMATED BLOOD LOSS:150mL.      COMPLICATIONS: None.      DISPOSITION: Recovery room in stable condition.      IMPLANTS:  Arthrex reverse total shoulder system  Humeral Stem: size 9  Humeral Tray: offset , 39  Polyethylene Liner: 39+6 std  Baseplate: 24, +4, 20mm central screw  Glenosphere: 39+4      INDICATIONS: This is a 68-year-old male with right shoulder pain and limited function and motion secondary to above diagnosis. They have failed conservative treatment and after a discussion of risks, benefits, and alternatives, wished to proceed with shoulder arthroplasty.    DESCRIPTION OF PROCEDURE: On the day of surgery, the patient identified the right shoulder as the correct operative extremity. This was initialed by the surgeon with the patients's acknowledgment. The patient underwent placement of an interscalene block and was taken to the operating room and placed in the supine position. Upon induction of adequate anesthesia, the patient was brought up to the beach chair position and the shoulder and upper extremity were prepped and draped in the usual sterile fashion. Timeout confirmed the correct patient and operative extremity as well as that antibiotics were on board. A standard deltopectoral approach to the shoulder was carried out. It was carried sharply through the skin and subcutaneous tissue. Medial and lateral flaps were developed over the  deltopectoral fascia. The cephalic vein was identified and mobilized laterally with the deltoid. The subdeltoid and subpectoral spaces were mobilized and a blunt retractor was placed deep to this. The clavipectoral fascia was opened on the lateral edge of the conjoined tendon and the retractor was moved deep to this. The leading edge of the pectoralis was released exposing the long head of the biceps. This was tenosynovitic and therefore tenotomized. The 3 sisters were identified and coagulated. A subscapularis tenotomy was performed and rotator interval was released to the glenoid exposing the humeral head. The inferior capsule was released directly off the humerus to allow greater than 90° of external rotation. The anatomic neck was exposed and the humeral head osteotomy was performed in approximately 20° of retroversion. The remainder of the osteophytes were removed. The canal was then entered, reamed, and broached. The final stem impacted in in approximately 20° of retroversion. A head protector was placed. The humerus was subluxed posteriorly. The glenoid exposed. Circumferential labral excision and capsular release were performed. A  mobilization of the subscapularis was carried out as well.  A centering hole was drilled. The glenoid was gently reamed and then the  central hole for the baseplate was drilled  glenoid baseplate inserted.  Screws were then placed through the baseplate  The glenosphere was then inserted and locked into place with a set screw.  The humerus was carefully subluxed back anteriorly. A liner tray and polyethylene were placed and trialing was carried out. The appropriate final sizes were chosen and locked into place.  The shoulder was then reduced.  This allowed nearly full passive range of motion with no instability. The joint was copiously irrigated with orthopedic irrigation mixed with Betadine after the final implants were assembled and locked into place.      vancomycin powder was  placed in the wound      The deltopectoral interval was approximated with 0 Vicryl, the subcutaneous tissue with 2-0 Vicryl, and the skin with staples A sterile dressing was placed. Anesthesia was reversed and the patient was taken to the recovery room in stable condition. All instrument, needle, and sponge counts were correct.      Jason Cobb MD, MS   Closure 3 Information: This tab is for additional flaps and grafts above and beyond our usual structured repairs.  Please note if you enter information here it will not currently bill and you will need to add the billing information manually.

## 2024-06-28 NOTE — ANESTHESIA PROCEDURE NOTES
Peripheral Block      Patient reassessed immediately prior to procedure    Patient location during procedure: pre-op  Start time: 6/28/2024 8:59 AM  Stop time: 6/28/2024 9:06 AM  Reason for block: at surgeon's request and post-op pain management  Performed by  CRNA/CAA: Connor Hoover, CRNA  Assisted by: Sherry Rosales RN  Preanesthetic Checklist  Completed: patient identified, IV checked, site marked, risks and benefits discussed, surgical consent, monitors and equipment checked, pre-op evaluation and timeout performed  Prep:  Pt Position: left lateral decubitus  Sterile barriers:cap, gloves, mask and washed/disinfected hands  Prep: ChloraPrep  Patient monitoring: blood pressure monitoring, continuous pulse oximetry and EKG  Procedure    Sedation: yes  Performed under: local infiltration  Guidance:ultrasound guided    ULTRASOUND INTERPRETATION.  Using ultrasound guidance a 20 G gauge needle was placed in close proximity to the nerve, at which point, under ultrasound guidance anesthetic was injected in the area of the nerve and spread of the anesthesia was seen on ultrasound in close proximity thereto.  There were no abnormalities seen on ultrasound; a digital image was taken; and the patient tolerated the procedure with no complications. Images:still images obtained, printed/placed on chart    Block Type:interscalene  Injection Technique:catheter  Needle Type:Tuohy and echogenic  Needle Gauge:18 G  Resistance on Injection: none  Catheter Size:20 G (20g)  Cath Depth at skin: 8 cm    Medications Used: fentaNYL citrate (PF) (SUBLIMAZE) injection - Intravenous   100 mcg - 6/28/2024 8:59:00 AM  bupivacaine PF (MARCAINE) 0.25 % injection - Injection   15 mL - 6/28/2024 9:06:00 AM      Medications  Preservative Free Saline:5ml    Post Assessment  Injection Assessment: negative aspiration for heme, no paresthesia on injection and incremental injection  Patient Tolerance:comfortable throughout  "block  Complications:no  Additional Notes  CATHETER  A high-frequency linear transducer, with sterile cover, was placed in the supraclavicular fossa to identify the subclavian artery and trunks and divisions of the brachial plexus. The transducer was then moved in a cephalad orientation with a slight rotation to continue visualization of the brachial plexus from the trunks and divisions, on to the C5-C7 roots. The insertion site was prepped and draped in sterile fashion. Skin and cutaneous tissue was infiltrated with 2-5 ml of 1% Lidocaine. Using ultrasound-guidance, an 18-gauge Contiplex Ultra 360 Touhy needle was advanced in plane from lateral to medial. Preservative-free normal saline was utilized for hydro-dissection of tissue, advancement of Touhy, and to confirm final needle placement at the fascial plane between the middle scalene muscle and sheath of the brachial plexus (C5-C7). A 20-gauge Contiplex Echo catheter was placed through the needle and advance out the tip of the Touhy 3-5 cm with the \"Ly Flip\". The Touhy needle was then removed, and final catheter position verified lateral to the brachial plexus with local anesthetic (LA) and ultrasound visualization. The catheter was secured in the usual fashion with skin glue, benzoin, steri-strips, CHG tegaderm and label noting \"Nerve Block Catheter\". Jerk tape applied at yellow connector and catheter connection. All LA was injected in increments of 3-5 ml after catheter secured. Aspiration every 5 ml to prevent intravascular injection. Injection was completed with negative aspiration of blood and negative intravascular injection. Injection pressures were normal with minimal resistance.   Performed by: Connor Hoover, CRNA            "

## 2024-06-28 NOTE — DISCHARGE INSTRUCTIONS
InfuBLOCK - Patient Information    What is a pain pump?  The InfuBLOCK pump delivers post-operative, non-narcotic, numbing medication to the nerve near the surgical site for pain relief.     Where can I find information about my pain pump?           For more information about your pain pump, scan the QR code.  For additional patient resources, visit [email or web address]/resources-pain-management.                                                                                               While your physician is your primary source for information about your treatment there may be times during your treatment that you need assistance with your infusion pump.     If you need assistance take the following steps:    [employer] Nursing Hotline is Here for You 24/7.  Please call 1-385.931.9477 for the following concerns or complications:    Answers to questions about your infusion pump                 Tubing disconnect  Assistance with pump alarms                                                      Dislodged catheter  Excessive leakage noted from pump                                         Inadequate pain control    2.   [facility] Anesthesia Acute Pain Service: [phone] is available 24/7 for any further needs or concerns about medication or pain control.     -------------------------------------------------------------------------    Nerve Catheter Removal Instructions  When your device is empty:    Remove your catheter by pulling the dressing off slowly (like you would remove a regular bandage). The catheter should pull right out of the skin.  Check that the BLUE tip is intact.                                                                                     If the catheter is stuck, reposition your   extremity and pull slowly until removed.  *If catheter is HURTING and WON'T come out, stop and call [phone] for further assistance.    Remove medication bag from the black carrying case.  Cut the  tubing on right and left side of pump, and discard the medication bag and tubing into garbage.  Place the pump and black carrying case into the plastic bag and then place this into the return box.  Seal box with blue stickers and return to [employer] postal service. THIS IS PRE-PAID POSTAGE.        -------------------------------------------------------------------------    [facility] COLD THERAPY - PATIENT INSTRUCTION SHEET    Cold Compression Therapy for your comfort and rehabilitation  Your caregivers want you to be productive in your rehab and comfortable during your stay. In keeping with those goals, you will be receiving an SMI Cold Therapy Wrap to help ease post-operative pain and swelling that might keep you from getting back on track! Your SMI Cold Therapy Wrap is effective and simple-to-use, and you will be encouraged to apply it throughout your hospital stay and at home through the duration of your recovery.    When you are ready to go home  Be sure to take your SMI Cold Therapy Wrap and both sets of Gel Bags with you for continued comfort and use throughout your rehabilitation. If you don't already have them, ask your nurse or aide to retrieve your SMI Gel Bags from the patient freezer.    Home use precautions  Always follow your medical professional's application instructions upon discharge. Your SMI Cold Therapy Wrap and Gel Bags are designed to last for months following your surgery. Never heat the Gel Bags unless specified by your healthcare provider. Supervision is advised when using this product on children or geriatric patients. To avoid danger of suffocation, please keep the outer plastic packaging away from children & pets.    Cold Therapy Instructions  Place Gel Bags in a freezer set ¾ of the way to max temperature for at least (4) hours. For best results, lay the Gel Bags flat and [id number] in the freezer. Once frozen, slide Gel Bags into the gel pouch and secure your wrap to the affected area with the  straps.  Gel wraps that have been stored in a freezer for an extended period of time may require a (10) minute period of softening up in a room temperature environment before application.  The gel pouch acts as a protective barrier. NEVER place frozen bags directly onto skin, as this may cause frostbite injury.  The [facility] Cold Therapy Wrap is designed to be able to be worm while ambulating. The compression straps can be secured well enough so that the Wrap won't fall off while moving.  Wrap Application Videos can be viewed at [employer].  An additional protective barrier such as clothing, a washcloth, hand-towel or pillowcase may be used during prolonged treatment applications.  The Gel-Pouch and Wrap are both Latex-Free and the Gel Bag ingredients are non toxic.    [facility] Wrap care instructions  The [facility] Cold Therapy Wrap may be hand washed and hung to dry when needed.    [facility] re-order information  Additional [facility] body specific wraps and/or Gel Bags can be re-ordered from [employer] or call [email or web address] ([phone])        InfuBLOCK - Patient Information    What is a pain pump?  The InfuBLOCK pump delivers post-operative, non-narcotic, numbing medication to the nerve near the surgical site for pain relief.     Where can I find information about my pain pump?           For more information about your pain pump, scan the QR code.  For additional patient resources, visit [email or web address].                                                                                               While your physician is your primary source for information about your treatment there may be times during your treatment that you need assistance with your infusion pump.     If you need assistance take the following steps:    The [employer] Nursing Hotline is Here for You 24/7.  Please call [phone] for the following concerns or complications:    Answers to questions about your infusion  pump                 Tubing disconnect  Assistance with pump alarms                                                      Dislodged catheter  Excessive leakage noted from pump                                         Inadequate pain control    2.   [facility] Anesthesia Acute Pain Service: [phone] is available 24/7 for any further needs or concerns about medication or pain control.     -------------------------------------------------------------------------    Nerve Catheter Removal Instructions  When your device is empty:    Remove your catheter by pulling the dressing off slowly (like you would remove a regular bandage). The catheter should pull right out of the skin.  Check that the BLUE tip is intact.                                                                                     If the catheter is stuck, reposition your   extremity and pull slowly until removed.  *If catheter is HURTING and WON'T come out, stop and call [phone] for further assistance.    Remove medication bag from the black carrying case.  Cut the tubing on right and left side of pump, and discard the medication bag and tubing into garbage.  Place the pump and black carrying case into the plastic bag and then place this into the return box.  Seal box with blue stickers and return to [place] postal service. THIS IS PRE-PAID POSTAGE.        -------------------------------------------------------------------------    [facility] COLD THERAPY - PATIENT INSTRUCTION SHEET    Cold Compression Therapy for your comfort and rehabilitation  Your caregivers want you to be productive in your rehab and comfortable during your stay. In keeping with those goals, you will be receiving an [facility] Cold Therapy Wrap to help ease post-operative pain and swelling that might keep you from getting back on track! Your SMI Cold Therapy Wrap is effective and simple-to-use, and you will be encouraged to apply it throughout your hospital stay and at home through the  duration of your recovery.    When you are ready to go home  Be sure to take your SMI Cold Therapy Wrap and both sets of Gel Bags with you for continued comfort and use throughout your rehabilitation. If you don't already have them, ask your nurse or aide to retrieve your SMI Gel Bags from the patient freezer.    Home use precautions  Always follow your medical professional's application instructions upon discharge. Your SMI Cold Therapy Wrap and Gel Bags are designed to last for months following your surgery. Never heat the Gel Bags unless specified by your healthcare provider. Supervision is advised when using this product on children or geriatric patients. To avoid danger of suffocation, please keep the outer plastic packaging away from children & pets.    Cold Therapy Instructions  Place Gel Bags in a freezer set ¾ of the way to max temperature for at least (4) hours. For best results, lay the Gel Bags flat and [id number] in the freezer. Once frozen, slide Gel Bags into the gel pouch and secure your wrap to the affected area with the straps.  Gel wraps that have been stored in a freezer for an extended period of time may require a (10) minute period of softening up in a room temperature environment before application.  The gel pouch acts as a protective barrier. NEVER place frozen bags directly onto skin, as this may cause frostbite injury.  The SMI Cold Therapy Wrap is designed to be able to be worm while ambulating. The compression straps can be secured well enough so that the Wrap won't fall off while moving.  Wrap Application Videos can be viewed at [email or web address].  An additional protective barrier such as clothing, a washcloth, hand-towel or pillowcase may be used during prolonged treatment applications.  The Gel-Pouch and Wrap are both Latex-Free and the Gel Bag ingredients are non toxic.    SMI Wrap care instructions  The SMI Cold Therapy Wrap may be hand washed and hung to dry when  needed.    [facility] re-order information  Additional [facility] body specific wraps and/or Gel Bags can be re-ordered from [email or web address] or call [phone] ([phone])

## 2024-06-28 NOTE — ANESTHESIA PREPROCEDURE EVALUATION
Anesthesia Evaluation     Patient summary reviewed and Nursing notes reviewed   no history of anesthetic complications:   NPO Solid Status: > 8 hours  NPO Liquid Status: > 2 hours           Airway   Mallampati: II  TM distance: >3 FB  Neck ROM: full  No difficulty expected  Dental    (+) partials    Pulmonary - normal exam   (+) ,sleep apnea  (-) asthma, not a smoker  Cardiovascular   Exercise tolerance: good (4-7 METS)    ECG reviewed    (+) hypertension, hyperlipidemia  (-) dysrhythmias, angina      Neuro/Psych  GI/Hepatic/Renal/Endo - negative ROS     Musculoskeletal     Abdominal    Substance History      OB/GYN          Other   arthritis,                 Anesthesia Plan    ASA 2     general with block     intravenous induction     Anesthetic plan, risks, benefits, and alternatives have been provided, discussed and informed consent has been obtained with: patient.    Plan discussed with CRNA.    CODE STATUS:

## 2024-06-29 LAB
ANION GAP SERPL CALCULATED.3IONS-SCNC: 9 MMOL/L (ref 5–15)
BASOPHILS # BLD AUTO: 0.02 10*3/MM3 (ref 0–0.2)
BASOPHILS NFR BLD AUTO: 0.1 % (ref 0–1.5)
BUN SERPL-MCNC: 17 MG/DL (ref 8–23)
BUN/CREAT SERPL: 21.3 (ref 7–25)
CALCIUM SPEC-SCNC: 8.7 MG/DL (ref 8.6–10.5)
CHLORIDE SERPL-SCNC: 102 MMOL/L (ref 98–107)
CO2 SERPL-SCNC: 23 MMOL/L (ref 22–29)
CREAT SERPL-MCNC: 0.8 MG/DL (ref 0.76–1.27)
DEPRECATED RDW RBC AUTO: 44.1 FL (ref 37–54)
EGFRCR SERPLBLD CKD-EPI 2021: 96.4 ML/MIN/1.73
EOSINOPHIL # BLD AUTO: 0 10*3/MM3 (ref 0–0.4)
EOSINOPHIL NFR BLD AUTO: 0 % (ref 0.3–6.2)
ERYTHROCYTE [DISTWIDTH] IN BLOOD BY AUTOMATED COUNT: 12.7 % (ref 12.3–15.4)
GLUCOSE SERPL-MCNC: 121 MG/DL (ref 65–99)
HCT VFR BLD AUTO: 37.3 % (ref 37.5–51)
HGB BLD-MCNC: 12.4 G/DL (ref 13–17.7)
IMM GRANULOCYTES # BLD AUTO: 0.05 10*3/MM3 (ref 0–0.05)
IMM GRANULOCYTES NFR BLD AUTO: 0.3 % (ref 0–0.5)
LYMPHOCYTES # BLD AUTO: 1.28 10*3/MM3 (ref 0.7–3.1)
LYMPHOCYTES NFR BLD AUTO: 7 % (ref 19.6–45.3)
MCH RBC QN AUTO: 31.2 PG (ref 26.6–33)
MCHC RBC AUTO-ENTMCNC: 33.2 G/DL (ref 31.5–35.7)
MCV RBC AUTO: 94 FL (ref 79–97)
MONOCYTES # BLD AUTO: 1.45 10*3/MM3 (ref 0.1–0.9)
MONOCYTES NFR BLD AUTO: 8 % (ref 5–12)
NEUTROPHILS NFR BLD AUTO: 15.41 10*3/MM3 (ref 1.7–7)
NEUTROPHILS NFR BLD AUTO: 84.6 % (ref 42.7–76)
NRBC BLD AUTO-RTO: 0 /100 WBC (ref 0–0.2)
PLATELET # BLD AUTO: 216 10*3/MM3 (ref 140–450)
PMV BLD AUTO: 11.4 FL (ref 6–12)
POTASSIUM SERPL-SCNC: 4.4 MMOL/L (ref 3.5–5.2)
RBC # BLD AUTO: 3.97 10*6/MM3 (ref 4.14–5.8)
SODIUM SERPL-SCNC: 134 MMOL/L (ref 136–145)
WBC NRBC COR # BLD AUTO: 18.21 10*3/MM3 (ref 3.4–10.8)

## 2024-06-29 PROCEDURE — A9270 NON-COVERED ITEM OR SERVICE: HCPCS | Performed by: INTERNAL MEDICINE

## 2024-06-29 PROCEDURE — 63710000001 OXYCODONE 5 MG TABLET: Performed by: ORTHOPAEDIC SURGERY

## 2024-06-29 PROCEDURE — 85025 COMPLETE CBC W/AUTO DIFF WBC: CPT | Performed by: ORTHOPAEDIC SURGERY

## 2024-06-29 PROCEDURE — 63710000001 SENNOSIDES-DOCUSATE 8.6-50 MG TABLET: Performed by: INTERNAL MEDICINE

## 2024-06-29 PROCEDURE — 63710000001 POLYETHYLENE GLYCOL 17 G PACK: Performed by: INTERNAL MEDICINE

## 2024-06-29 PROCEDURE — A9270 NON-COVERED ITEM OR SERVICE: HCPCS | Performed by: ORTHOPAEDIC SURGERY

## 2024-06-29 PROCEDURE — 25010000002 CEFAZOLIN PER 500 MG: Performed by: ORTHOPAEDIC SURGERY

## 2024-06-29 PROCEDURE — 97535 SELF CARE MNGMENT TRAINING: CPT | Performed by: OCCUPATIONAL THERAPIST

## 2024-06-29 PROCEDURE — 25810000003 SODIUM CHLORIDE 0.9 % SOLUTION: Performed by: INTERNAL MEDICINE

## 2024-06-29 PROCEDURE — 97110 THERAPEUTIC EXERCISES: CPT | Performed by: OCCUPATIONAL THERAPIST

## 2024-06-29 PROCEDURE — 63710000001 ATORVASTATIN 10 MG TABLET: Performed by: INTERNAL MEDICINE

## 2024-06-29 PROCEDURE — 80048 BASIC METABOLIC PNL TOTAL CA: CPT | Performed by: ORTHOPAEDIC SURGERY

## 2024-06-29 RX ORDER — DOCUSATE SODIUM 100 MG/1
100 CAPSULE, LIQUID FILLED ORAL 2 TIMES DAILY
Qty: 30 CAPSULE | Refills: 0 | Status: SHIPPED | OUTPATIENT
Start: 2024-06-29 | End: 2024-07-14

## 2024-06-29 RX ORDER — OXYCODONE HYDROCHLORIDE 5 MG/1
5 TABLET ORAL EVERY 4 HOURS PRN
Qty: 30 TABLET | Refills: 0 | Status: SHIPPED | OUTPATIENT
Start: 2024-06-29

## 2024-06-29 RX ORDER — ROPIVACAINE HYDROCHLORIDE 2 MG/ML
1 INJECTION, SOLUTION EPIDURAL; INFILTRATION; PERINEURAL CONTINUOUS
Start: 2024-06-29

## 2024-06-29 RX ORDER — BISACODYL 10 MG
10 SUPPOSITORY, RECTAL RECTAL DAILY PRN
Status: DISCONTINUED | OUTPATIENT
Start: 2024-06-29 | End: 2024-06-30 | Stop reason: HOSPADM

## 2024-06-29 RX ORDER — POLYETHYLENE GLYCOL 3350 17 G/17G
17 POWDER, FOR SOLUTION ORAL DAILY PRN
Status: DISCONTINUED | OUTPATIENT
Start: 2024-06-29 | End: 2024-06-30 | Stop reason: HOSPADM

## 2024-06-29 RX ORDER — SODIUM CHLORIDE 9 MG/ML
100 INJECTION, SOLUTION INTRAVENOUS CONTINUOUS
Status: DISCONTINUED | OUTPATIENT
Start: 2024-06-29 | End: 2024-06-30 | Stop reason: HOSPADM

## 2024-06-29 RX ORDER — AMOXICILLIN 250 MG
2 CAPSULE ORAL 2 TIMES DAILY
Status: DISCONTINUED | OUTPATIENT
Start: 2024-06-29 | End: 2024-06-30 | Stop reason: HOSPADM

## 2024-06-29 RX ORDER — UREA 10 %
5 LOTION (ML) TOPICAL NIGHTLY PRN
Status: DISCONTINUED | OUTPATIENT
Start: 2024-06-29 | End: 2024-06-30 | Stop reason: HOSPADM

## 2024-06-29 RX ORDER — BISACODYL 5 MG/1
5 TABLET, DELAYED RELEASE ORAL DAILY PRN
Status: DISCONTINUED | OUTPATIENT
Start: 2024-06-29 | End: 2024-06-30 | Stop reason: HOSPADM

## 2024-06-29 RX ADMIN — SENNOSIDES AND DOCUSATE SODIUM 2 TABLET: 50; 8.6 TABLET ORAL at 20:21

## 2024-06-29 RX ADMIN — OXYCODONE HYDROCHLORIDE 5 MG: 5 TABLET ORAL at 11:37

## 2024-06-29 RX ADMIN — OXYCODONE HYDROCHLORIDE 5 MG: 5 TABLET ORAL at 20:21

## 2024-06-29 RX ADMIN — POLYETHYLENE GLYCOL 3350 17 G: 17 POWDER, FOR SOLUTION ORAL at 22:42

## 2024-06-29 RX ADMIN — SODIUM CHLORIDE 2000 MG: 900 INJECTION INTRAVENOUS at 01:34

## 2024-06-29 RX ADMIN — OXYCODONE HYDROCHLORIDE 5 MG: 5 TABLET ORAL at 08:07

## 2024-06-29 RX ADMIN — SODIUM CHLORIDE 500 ML: 9 INJECTION, SOLUTION INTRAVENOUS at 13:35

## 2024-06-29 RX ADMIN — SODIUM CHLORIDE 100 ML/HR: 9 INJECTION, SOLUTION INTRAVENOUS at 14:17

## 2024-06-29 RX ADMIN — ATORVASTATIN CALCIUM 10 MG: 10 TABLET, FILM COATED ORAL at 20:21

## 2024-06-29 NOTE — DISCHARGE INSTR - ACTIVITY
Non-weightbearing right upper arm, range of motion hand, wrist, elbow per OT recommendations.     OK to shower once nerve block comes out    Your dressing is waterproof and ok to shower with    Please keep your dressing in place until your follow up with Dr. Cobb

## 2024-06-29 NOTE — PROGRESS NOTES
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  Catheter Site:dry, clean and dressing intact  Cath type: peripheral nerve cath(InfuSystem)  Volume:  settings for nerve catheter.  Dosing/Volume: ropivacaine 0.2%  Catheter Plan:Catheter to remain Insitu and Bolus Catheter  Comments: Catheter bolused per Dr. Ivy at 15:00 5 ml Bupivicaine 0.25%.  Per pt pain improving.    Of note pt stated at approximately 13:00 had an brief episode of lightheadedness and then slight right eye droop after standing to go to restroom.  Pt was not aware of any eye droop prior to episode.  Tongue midline, strong right leg, equal smile, no hoarseness, pupils appear equal and reactive symmetrically.    Possible Jenny's from nerve block.

## 2024-06-29 NOTE — THERAPY TREATMENT NOTE
Patient Name: George Pickard  : 1955    MRN: [id number]                              Today's Date: 2024       Admit Date: 2024    Visit Dx:     ICD-10-CM ICD-9-CM   1. Status post reverse total replacement of right shoulder  Z96.611 V43.61     Patient Active Problem List   Diagnosis    Rotator cuff tear    Right shoulder pain    HTN (hypertension)    Hyperlipidemia    Status post reverse total replacement of right shoulder     Past Medical History:   Diagnosis Date    Arthritis     Hyperlipidemia     Hypertension     ROGE (obstructive sleep apnea)     does not use cpap     Past Surgical History:   Procedure Laterality Date    COLECTOMY PARTIAL / TOTAL      COLONOSCOPY      ENDOSCOPY      ROTATOR CUFF REPAIR Bilateral     R-, L-      General Information       Row Name 24 2762          OT Time and Intention    Document Type therapy note (daily note)  -AR     Mode of Treatment individual therapy;occupational therapy  -AR       Row Name 24 7145          General Information    Existing Precautions/Restrictions right;shoulder;non-weight bearing;other (see comments);left  [employer] Ultra II sling with pillow, hypotensive  -AR     Barriers to Rehab none identified  -AR       Row Name 24 2275          Cognition    Orientation Status (Cognition) oriented x 4  -AR       Row Name 24 1355          Safety Issues, Functional Mobility    Safety Issues Affecting Function (Mobility) safety precautions follow-through/compliance;safety precaution awareness;problem-solving  -AR     Impairments Affecting Function (Mobility) strength;sensation/sensory awareness;range of motion (ROM);pain  -AR               User Key  (r) = Recorded By, (t) = Taken By, (c) = Cosigned By      Initials Name Provider Type    Tete Jc OT Occupational Therapist                     Mobility/ADL's       Row Name 24 1435          Bed Mobility    Bed Mobility  scooting/bridging;supine-sit;sit-supine  -AR     Scooting/Bridging [place] (Bed Mobility) supervision;verbal cues  -AR     Supine-Sit [place] (Bed Mobility) verbal cues;contact guard  -AR     Sit-Supine [place] (Bed Mobility) minimum assist (75% patient effort);verbal cues  -AR     Assistive Device (Bed Mobility) head of bed elevated;bed rails  -AR     Comment, (Bed Mobility) OT educated pt on importance of maintaining NWB and safe sleeping position.  -AR       Row Name 06/29/24 1358          Transfers    Transfers sit-stand transfer;stand-sit transfer  -AR     Comment, (Transfers) Educated pt on importance of attending to interscalene nerve catheter to avoid dislodgement.  -AR       Row Name 06/29/24 1358          Sit-Stand Transfer    Sit-Stand [place] (Transfers) supervision;verbal cues  -AR       Row Name 06/29/24 1358          Stand-Sit Transfer    Stand-Sit [place] (Transfers) supervision;verbal cues  -AR       Row Name 06/29/24 1358          Functional Mobility    Functional Mobility- Comment Unable to mobilize d/t pt with episode of decreased alertness and BP EOB 59/45.  -AR       Row Name 06/29/24 1358          Activities of Daily Living    BADL Assessment/Intervention upper body dressing;bathing;lower body dressing;feeding  -AR       Row Name 06/29/24 1358          Mobility    Extremity Weight-bearing Status right upper extremity  -AR     Right Upper Extremity (Weight-bearing Status) non weight-bearing (NWB)  -AR       Row Name 06/29/24 1358          Bathing Assessment/Intervention    Comment, (Bathing) Educated pt on shoulder precautions and axilla care to maintain, reviewed that nerve catheter cannot get wet.  -AR       Row Name 06/29/24 1358          Upper Body Dressing Assessment/Training    [place] Level (Upper Body Dressing) doff;don;front opening garment;moderate assist (50% patient effort)  -AR     Position (Upper Body Dressing) edge of bed sitting  -AR     Comment,  (Upper Body Dressing) Educated pt on shoulder precautions, ADL retraining to maintain, sling management and care of interscalene nerve catheter during ADLs to avoid dislodgement. Brother at bedside and able to don sling with supervision.  -AR       Row Name 06/29/24 1358          Self-Feeding Assessment/Training    [place] Level (Feeding) independent;finger foods;liquids to mouth  -AR     Position (Self-Feeding) supine  -AR       Row Name 06/29/24 1358          Lower Body Dressing Assessment/Training    [place] Level (Lower Body Dressing) don;pants/bottoms;minimum assist (75% patient effort)  -AR     Position (Lower Body Dressing) edge of bed sitting;unsupported standing  -AR               User Key  (r) = Recorded By, (t) = Taken By, (c) = Cosigned By      Initials Name Provider Type    Tete Jc, OT Occupational Therapist                   Obj/Interventions       Row Name 06/29/24 1400          Sensory Assessment (Somatosensory)    Sensory Assessment (Somatosensory) sensation intact  -AR       Row Name 06/29/24 1400          Vision Assessment/Intervention    Vision Assessment Comment right eye ptosis- RN notified  -AR       Row Name 06/29/24 1400          Elbow/Forearm (Therapeutic Exercise)    Elbow/Forearm (Therapeutic Exercise) AAROM (active assistive range of motion);AROM (active range of motion)  -AR     Elbow/Forearm AROM (Therapeutic Exercise) right;supination;pronation;sitting;10 repetitions  -AR     Elbow/Forearm AAROM (Therapeutic Exercise) left assist right;sitting;10 repetitions  -AR       Row Name 06/29/24 1400          Wrist (Therapeutic Exercise)    Wrist AAROM (Therapeutic Exercise) right;flexion;extension;10 repetitions  -AR       Row Name 06/29/24 1400          Hand (Therapeutic Exercise)    Hand AROM/AAROM (Therapeutic Exercise) right;AROM (active range of motion);finger extension;finger aBduction;10 repetitions  -AR       Row Name 06/29/24 1400          Motor Skills     Therapeutic Exercise elbow/forearm;wrist;hand  -AR       Row Name 06/29/24 1400          Balance    Balance Assessment sitting static balance;sitting dynamic balance;standing static balance;standing dynamic balance  -AR     Static Sitting Balance supervision  -AR     Dynamic Sitting Balance supervision  -AR     Position, Sitting Balance unsupported;sitting edge of bed  -AR     Static Standing Balance supervision  -AR     Dynamic Standing Balance supervision  -AR     Position/Device Used, Standing Balance unsupported  -AR               User Key  (r) = Recorded By, (t) = Taken By, (c) = Cosigned By      Initials Name Provider Type    Tete Jc, MARTIN Occupational Therapist                   Goals/Plan    No documentation.                  Clinical Impression       Row Name 06/29/24 1402          Pain Assessment    Pretreatment Pain Rating 8/10  -AR     Posttreatment Pain Rating 8/10  -AR     Pain Location - Side/Orientation Right  -AR     Pain Location anterior  -AR     Pain Location - shoulder  -AR     Pre/Posttreatment Pain Comment Pt reports no relief with use of PCA feature on block and constant ache anterior shoulder. OT called RN and requested APS assess block prior to DC as he lives 3 hours away.  -AR     Pain Intervention(s) Medication (See MAR);Cold applied;Repositioned;Ambulation/increased activity;Nursing Notified  -AR       Row Name 06/29/24 1402          Plan of Care Review    Plan of Care Reviewed With patient;sibling  -AR     Progress declining  -AR     Outcome Evaluation OT educated pt and family on shoulder precautions, ADL retraining to maintain, sling management and HEP. At start of session, pt reports 8/10 pain R anterior shoulder and no relief with use of PCA feature on block. Reports increasing pain since 10:00 am and no relief with block or oral pain meds. RN notified APS requesting block be assessed. Pt with limited carry-over of teaching, however brother at bedside with appropriate  recall and additional family will be assisting. Pt and brother managed sling and HEP with supervision.  At end of session, pt sitting EOB level and reported feeling light headed, followed by episode of decreased alertness/responsiveness. BP 59/45, pt was assisted to supine level and RN and charge nurse at bedside to assess. Deferred mobility d/t medical status.  -AR       Row Name 06/29/24 1402          Therapy Plan Review/Discharge Plan (OT)    Anticipated Discharge Disposition (OT) home with 24/7 care  -AR       Row Name 06/29/24 1402          Vital Signs    Intra Systolic BP Rehab 59   -AR     Intra Treatment Diastolic BP 45  -AR     Post Systolic BP Rehab 103  -AR     Post Treatment Diastolic BP 60  -AR     Pre Patient Position Supine  -AR     Intra Patient Position Sitting  -AR     Post Patient Position Supine  -AR       Row Name 06/29/24 1402          Positioning and Restraints    Pre-Treatment Position in bed  -AR     Post Treatment Position bed  -AR     In Bed supine;call light within reach;encouraged to call for assist;exit alarm on;with family/caregiver;SCD pump applied;with brace  cold pack applied over incision  -AR               User Key  (r) = Recorded By, (t) = Taken By, (c) = Cosigned By      Initials Name Provider Type    Tete Jc, OT Occupational Therapist                   Outcome Measures       Row Name 06/29/24 1409          How much help from another is currently needed...    Putting on and taking off regular lower body clothing? 3  -AR     Bathing (including washing, rinsing, and drying) 3  -AR     Toileting (which includes using toilet bed pan or urinal) 3  -AR     Putting on and taking off regular upper body clothing 2  -AR     Taking care of personal grooming (such as brushing teeth) 3  -AR     Eating meals 3  -AR     AM-PAC 6 Clicks Score (OT) 17  -AR       Row Name 06/29/24 0807          How much help from another person do you currently need...    Turning from your back to  your side while in flat bed without using bedrails? 3  -AP     Moving from lying on back to sitting on the side of a flat bed without bedrails? 3  -AP     Moving to and from a bed to a chair (including a wheelchair)? 3  -AP     Standing up from a chair using your arms (e.g., wheelchair, bedside chair)? 3  -AP     Climbing 3-5 steps with a railing? 3  -AP     To walk in hospital room? 3  -AP     AM-PAC 6 Clicks Score (PT) 18  -AP     Highest Level of Mobility Goal 6 --> Walk 10 steps or more  -AP       Row Name 06/29/24 1409          Functional Assessment    Outcome Measure Options AM-PAC 6 Clicks Daily Activity (OT)  -AR               User Key  (r) = Recorded By, (t) = Taken By, (c) = Cosigned By      Initials Name Provider Type    Tete Jc OT Occupational Therapist    Sherry Sotomayor, RN Registered Nurse                    Occupational Therapy Education       Title: PT OT SLP Therapies (Done)       Topic: Occupational Therapy (Done)       Point: ADL training (Done)       Description:   Instruct learner(s) on proper safety adaptation and remediation techniques during self care or transfers.   Instruct in proper use of assistive devices.                  Learning Progress Summary             Patient Eager, TB,E,D,H, VU,DU by AR at 6/29/2024 1409    Acceptance, E,D, VU,NR by TB at 6/28/2024 1546   Family Eager, TB,E,D,H, VU,DU by AR at 6/29/2024 1409                         Point: Home exercise program (Done)       Description:   Instruct learner(s) on appropriate technique for monitoring, assisting and/or progressing therapeutic exercises/activities.                  Learning Progress Summary             Patient Eager, TB,E,D,H, VU,DU by AR at 6/29/2024 1409    Acceptance, E,D, VU,NR by TB at 6/28/2024 1546   Family Eager, TB,E,D,H, VU,DU by AR at 6/29/2024 1409                         Point: Precautions (Done)       Description:   Instruct learner(s) on prescribed precautions during self-care and  functional transfers.                  Learning Progress Summary             Patient Eager, TB,E,D,H, VU,DU by AR at 6/29/2024 1409    Acceptance, E,D, VU,NR by TB at 6/28/2024 1546   Family Eager, TB,E,D,H, VU,DU by AR at 6/29/2024 1409                         Point: Body mechanics (Done)       Description:   Instruct learner(s) on proper positioning and spine alignment during self-care, functional mobility activities and/or exercises.                  Learning Progress Summary             Patient Eager, TB,E,D,H, VU,DU by AR at 6/29/2024 1409   Family Eager, TB,E,D,H, VU,DU by AR at 6/29/2024 1409                                         User Key       Initials Effective Dates Name Provider Type Discipline    TB 07/11/23 -  Diana Delarosa, OT Occupational Therapist OT    AR 07/11/23 -  Tete Bone, OT Occupational Therapist OT                  OT Recommendation and Plan     Plan of Care Review  Plan of Care Reviewed With: patient, sibling  Progress: declining  Outcome Evaluation: OT educated pt and family on shoulder precautions, ADL retraining to maintain, sling management and HEP. At start of session, pt reports 8/10 pain R anterior shoulder and no relief with use of PCA feature on block. Reports increasing pain since 10:00 am and no relief with block or oral pain meds. RN notified APS requesting block be assessed. Pt with limited carry-over of teaching, however brother at bedside with appropriate recall and additional family will be assisting. Pt and brother managed sling and HEP with supervision.  At end of session, pt sitting EOB level and reported feeling light headed, followed by episode of decreased alertness/responsiveness. BP 59/45, pt was assisted to supine level and RN and charge nurse at bedside to assess. Deferred mobility d/t medical status.     Time Calculation:         Time Calculation- OT       Row Name 06/29/24 1410             Time Calculation- OT    OT Start Time 1306  -AR       OT Received On 06/29/24  -AR      OT Goal Re-Cert Due Date 07/08/24  -AR         Timed Charges    81862 - OT Therapeutic Exercise Minutes 10  -AR      81588 - OT Self Care/Mgmt Minutes 44  -AR         Total Minutes    Timed Charges Total Minutes 54  -AR       Total Minutes 54  -AR                User Key  (r) = Recorded By, (t) = Taken By, (c) = Cosigned By      Initials Name Provider Type    AR Tete Bone OT Occupational Therapist                  Therapy Charges for Today       Code Description Service Date Service Provider Modifiers Qty    48193538891 HC OT SELF CARE/MGMT/TRAIN EA 15 MIN 6/29/2024 Tete Bone OT GO 3    39934274354 HC OT THER PROC EA 15 MIN 6/29/2024 Tete Bone OT GO 1                 Tete Bone OT  6/29/2024

## 2024-06-29 NOTE — PLAN OF CARE
Goal Outcome Evaluation:  Plan of Care Reviewed With: patient, sibling        Progress: improving     Pt aox4, vss, room air, iv fluids infusing per order, scds on, ice/sling in place, dsg to torri cdi, had an episode of orthostatic hypotension in the afternoon when working with OT-pt sitting eob and experienced an episode of lightheadedness and decreased responsiveness-BP obtained was 59/45-pt was put back to bed and prn bolus given-MD notified, bp improved to 123/68 quickly after getting back to bed and pt responsive aox4, orthostatic bp's completed and pt has not reported any further symptoms or orthostatic bp, pain services checked infu-block this shift-infusing and controlling pain at this time and dsg cdi, voiding spontaneously, pt plans to dc home tomorrow..     Problem: Fall Injury Risk  Goal: Absence of Fall and Fall-Related Injury  Intervention: Promote Injury-Free Environment  Recent Flowsheet Documentation  Taken 6/29/2024 1845 by Sherry Jean-Baptiste, RN  Safety Promotion/Fall Prevention:   activity supervised   assistive device/personal items within reach   clutter free environment maintained   fall prevention program maintained   elopement precautions   gait belt   lighting adjusted   mobility aid in reach   muscle strengthening facilitated   nonskid shoes/slippers when out of bed   room organization consistent   safety round/check completed   toileting scheduled  Taken 6/29/2024 1625 by Sherry Jean-Baptiste, RN  Safety Promotion/Fall Prevention:   activity supervised   assistive device/personal items within reach   clutter free environment maintained   elopement precautions   fall prevention program maintained   gait belt   lighting adjusted   mobility aid in reach   muscle strengthening facilitated   nonskid shoes/slippers when out of bed   room organization consistent   safety round/check completed   toileting scheduled  Taken 6/29/2024 1417 by Sherry Jean-Baptiste, RN  Safety Promotion/Fall Prevention:    activity supervised   assistive device/personal items within reach   clutter free environment maintained   elopement precautions   fall prevention program maintained   gait belt   lighting adjusted   mobility aid in reach   muscle strengthening facilitated   nonskid shoes/slippers when out of bed   room organization consistent   safety round/check completed   toileting scheduled  Taken 6/29/2024 1335 by Sherry Jean-Baptiste, RN  Safety Promotion/Fall Prevention:   activity supervised   assistive device/personal items within reach   clutter free environment maintained   elopement precautions   fall prevention program maintained   gait belt   lighting adjusted   mobility aid in reach   muscle strengthening facilitated   nonskid shoes/slippers when out of bed   room organization consistent   safety round/check completed   toileting scheduled  Taken 6/29/2024 1137 by Sherry Jean-Baptiste, RN  Safety Promotion/Fall Prevention:   activity supervised   clutter free environment maintained   assistive device/personal items within reach   elopement precautions   fall prevention program maintained   gait belt   lighting adjusted   mobility aid in reach   muscle strengthening facilitated   nonskid shoes/slippers when out of bed   room organization consistent   safety round/check completed   toileting scheduled  Taken 6/29/2024 1020 by Sherry Jean-Baptiste, RN  Safety Promotion/Fall Prevention:   activity supervised   assistive device/personal items within reach   clutter free environment maintained   elopement precautions   fall prevention program maintained   gait belt   lighting adjusted   mobility aid in reach   muscle strengthening facilitated   nonskid shoes/slippers when out of bed   room organization consistent   safety round/check completed   toileting scheduled  Taken 6/29/2024 0807 by Sherry Jean-Baptiste, RN  Safety Promotion/Fall Prevention:   activity supervised   assistive device/personal items within reach   clutter free  environment maintained   elopement precautions   fall prevention program maintained   gait belt   lighting adjusted   mobility aid in reach   muscle strengthening facilitated   nonskid shoes/slippers when out of bed   room organization consistent   safety round/check completed   toileting scheduled     Problem: Adult Inpatient Plan of Care  Goal: Absence of Hospital-Acquired Illness or Injury  Intervention: Identify and Manage Fall Risk  Recent Flowsheet Documentation  Taken 6/29/2024 1845 by Sherry Jean-Baptiste, RN  Safety Promotion/Fall Prevention:   activity supervised   assistive device/personal items within reach   clutter free environment maintained   fall prevention program maintained   elopement precautions   gait belt   lighting adjusted   mobility aid in reach   muscle strengthening facilitated   nonskid shoes/slippers when out of bed   room organization consistent   safety round/check completed   toileting scheduled  Taken 6/29/2024 1625 by Sherry Jean-Baptiste, RN  Safety Promotion/Fall Prevention:   activity supervised   assistive device/personal items within reach   clutter free environment maintained   elopement precautions   fall prevention program maintained   gait belt   lighting adjusted   mobility aid in reach   muscle strengthening facilitated   nonskid shoes/slippers when out of bed   room organization consistent   safety round/check completed   toileting scheduled  Taken 6/29/2024 1417 by Sherry Jean-Baptiste, RN  Safety Promotion/Fall Prevention:   activity supervised   assistive device/personal items within reach   clutter free environment maintained   elopement precautions   fall prevention program maintained   gait belt   lighting adjusted   mobility aid in reach   muscle strengthening facilitated   nonskid shoes/slippers when out of bed   room organization consistent   safety round/check completed   toileting scheduled  Taken 6/29/2024 1335 by Sherry Jean-Baptiste, RN  Safety Promotion/Fall  Prevention:   activity supervised   assistive device/personal items within reach   clutter free environment maintained   elopement precautions   fall prevention program maintained   gait belt   lighting adjusted   mobility aid in reach   muscle strengthening facilitated   nonskid shoes/slippers when out of bed   room organization consistent   safety round/check completed   toileting scheduled  Taken 6/29/2024 1137 by Sherry Jean-Baptiste, RN  Safety Promotion/Fall Prevention:   activity supervised   clutter free environment maintained   assistive device/personal items within reach   elopement precautions   fall prevention program maintained   gait belt   lighting adjusted   mobility aid in reach   muscle strengthening facilitated   nonskid shoes/slippers when out of bed   room organization consistent   safety round/check completed   toileting scheduled  Taken 6/29/2024 1020 by Sherry Jean-Baptiste, RN  Safety Promotion/Fall Prevention:   activity supervised   assistive device/personal items within reach   clutter free environment maintained   elopement precautions   fall prevention program maintained   gait belt   lighting adjusted   mobility aid in reach   muscle strengthening facilitated   nonskid shoes/slippers when out of bed   room organization consistent   safety round/check completed   toileting scheduled  Taken 6/29/2024 0807 by Sherry Jean-Baptiste, RN  Safety Promotion/Fall Prevention:   activity supervised   assistive device/personal items within reach   clutter free environment maintained   elopement precautions   fall prevention program maintained   gait belt   lighting adjusted   mobility aid in reach   muscle strengthening facilitated   nonskid shoes/slippers when out of bed   room organization consistent   safety round/check completed   toileting scheduled

## 2024-06-29 NOTE — PLAN OF CARE
Problem: Bleeding (Shoulder Arthroplasty)  Goal: Absence of Bleeding  Outcome: Ongoing, Progressing  Intervention: Monitor and Manage Bleeding  Description: Monitor and manage anticoagulation therapy.  Assess bleeding risk and presence of bleeding; review laboratory result trends, medical history and physical presentation.  Identify source of bleeding; consider potential for additional diagnostic testing. If able, apply direct pressure to visible bleeding site; notify healthcare provider.  Maintain body temperature within desired range to optimize clotting ability.  Maintain dressing integrity to avoid disrupting clotting process; reinforce as needed.  Monitor and measure drainage amount and characteristics from surgical site, drain and dressing.  Consider need for fluid volume replacement (e.g., intravenous fluid, blood products) to maintain perfusion.  Evaluate for orthostatic hypotension prior to activity.  Consider need for pharmacologic treatment, such as topical hemostatic, coagulation factor concentrate or antifibrinolytic.  Anticipate need for surgical intervention with continued bleeding and circulatory instability.     Problem: Fluid and Electrolyte Imbalance (Shoulder Arthroplasty)  Goal: Fluid and Electrolyte Balance  Outcome: Ongoing, Progressing  Intervention: Monitor and Manage Fluid and Electrolyte Balance  Description: Assess fluid requirements to determine goal-directed fluid therapy.  Keep accurate intake, output and daily weight; monitor trends.  Monitor laboratory value trends; anticipate the need for intravenous or oral electrolyte replacement, binding therapy or restriction.  Assess need for ongoing intravenous fluid therapy; encourage oral intake when able.  Anticipate need for diuretic agent; monitor effects if administered (e.g., blood pressure change, dysrhythmia, electrolyte alteration).  Evaluate causes and potential sources that may lead to imbalance, such as illness severity, organ  failure, mental status, mobility limitations, swallowing difficulties, intravenous fluid and medication side effects.  Monitor and maintain skin integrity; avoid constrictive devices and clothing if edema is present.  Maintain optimal position to relieve discomfort, breathlessness and ventilation-perfusion mismatch.     Problem: Functional Ability Impaired (Shoulder Arthroplasty)  Goal: Optimal Functional Ability  Outcome: Ongoing, Progressing  Intervention: Promote Optimal Functional Status  Description: Implement multidisciplinary rehabilitation following early mobility guidelines; coordinate pain control to optimize comfort with activity.  Identify functional limitations, such as ADL (activities of daily living), mobility safety and independence; encourage optimal participation to minimize decline associated with inactivity.  Assess and retrain in functional mobility; avoid direct pushing, pulling, lifting or weightbearing on the surgical arm; assess balance and safety with mobility.  Encourage ADL (activities of daily living), such as self-feeding, hygiene and dressing; provide set-up, adaptations, assistance and extra time as needed.  Promote a safe and accessible environment and effective use of assistive devices and equipment.  Pace and cluster activity to balance with rest periods and conserve energy; promote adequate nutrition, sleep and rest.  Facilitate range of motion and prescribed exercises, such as pendulum exercises, fist pumps and elbow motion.  Evaluate and address performance deficits, such as cognitive, balance and activity tolerance impairments.     Problem: Neurovascular Compromise (Shoulder Arthroplasty)  Goal: Intact Neurovascular Status  Outcome: Ongoing, Progressing     Problem: Pain (Shoulder Arthroplasty)  Goal: Acceptable Pain Control  Outcome: Ongoing, Progressing  Intervention: Prevent or Manage Pain  Description: Develop mutual pain management plan with patient and caregiver/family;  review regularly.  Evaluate risk for opioid use; individualize pharmacologic pain management plan and titrate medication to patient response.  Combine multimodal analgesia and nonpharmacologic strategies to help potentiate synergistic effects, enhance comfort and improve function (e.g., complementary therapy, diversional activity, mindfulness).  Provide around-the-clock dosing of pain medication to keep pain levels in control.  Manage medication-induced effects, such as respiratory depression, constipation, nausea, vomiting.  Minimize pain stimuli; coordinate care and adjust environment (e.g., light, noise, unnecessary movement); promote sleep/rest for optimal healing.   Goal Outcome Evaluation:

## 2024-06-29 NOTE — PLAN OF CARE
Goal Outcome Evaluation:  Plan of Care Reviewed With: patient, sibling        Progress: declining  Outcome Evaluation: OT educated pt and family on shoulder precautions, ADL retraining to maintain, sling management and HEP. Pt reports 8/10 pain R anterior shoulder and no relief with use of PCA feature on block or oral pain medication. RN notified APS requesting block be assessed. Pt with limited carry-over of teaching, however brother at bedside with appropriate recall and additional family will be assisting. Pt and brother managed sling and HEP with supervision. During session, pt sitting EOB level and reported feeling light headed, followed by episode of decreased alertness/responsiveness. BP 59/45, pt was assisted to supine level and RN and charge nurse at bedside to assess. Deferred mobility d/t medical status.      Anticipated Discharge Disposition (OT): home with 24/7 care

## 2024-06-30 VITALS
SYSTOLIC BLOOD PRESSURE: 133 MMHG | OXYGEN SATURATION: 95 % | BODY MASS INDEX: 30.64 KG/M2 | RESPIRATION RATE: 17 BRPM | HEIGHT: 70 IN | DIASTOLIC BLOOD PRESSURE: 66 MMHG | WEIGHT: 214 LBS | HEART RATE: 82 BPM | TEMPERATURE: 98 F

## 2024-06-30 LAB
DEPRECATED RDW RBC AUTO: 45.8 FL (ref 37–54)
ERYTHROCYTE [DISTWIDTH] IN BLOOD BY AUTOMATED COUNT: 13.1 % (ref 12.3–15.4)
HCT VFR BLD AUTO: 37.5 % (ref 37.5–51)
HGB BLD-MCNC: 12.3 G/DL (ref 13–17.7)
MCH RBC QN AUTO: 31.1 PG (ref 26.6–33)
MCHC RBC AUTO-ENTMCNC: 32.8 G/DL (ref 31.5–35.7)
MCV RBC AUTO: 94.9 FL (ref 79–97)
PLATELET # BLD AUTO: 216 10*3/MM3 (ref 140–450)
PMV BLD AUTO: 11.5 FL (ref 6–12)
RBC # BLD AUTO: 3.95 10*6/MM3 (ref 4.14–5.8)
WBC NRBC COR # BLD AUTO: 11.94 10*3/MM3 (ref 3.4–10.8)

## 2024-06-30 PROCEDURE — 63710000001 SENNOSIDES-DOCUSATE 8.6-50 MG TABLET: Performed by: INTERNAL MEDICINE

## 2024-06-30 PROCEDURE — A9270 NON-COVERED ITEM OR SERVICE: HCPCS | Performed by: ORTHOPAEDIC SURGERY

## 2024-06-30 PROCEDURE — A9270 NON-COVERED ITEM OR SERVICE: HCPCS | Performed by: INTERNAL MEDICINE

## 2024-06-30 PROCEDURE — 85027 COMPLETE CBC AUTOMATED: CPT | Performed by: INTERNAL MEDICINE

## 2024-06-30 PROCEDURE — 63710000001 ACETAMINOPHEN 325 MG TABLET: Performed by: ORTHOPAEDIC SURGERY

## 2024-06-30 PROCEDURE — 97530 THERAPEUTIC ACTIVITIES: CPT | Performed by: OCCUPATIONAL THERAPIST

## 2024-06-30 PROCEDURE — 63710000001 OXYCODONE 5 MG TABLET: Performed by: ORTHOPAEDIC SURGERY

## 2024-06-30 PROCEDURE — 97535 SELF CARE MNGMENT TRAINING: CPT | Performed by: OCCUPATIONAL THERAPIST

## 2024-06-30 PROCEDURE — 97110 THERAPEUTIC EXERCISES: CPT | Performed by: OCCUPATIONAL THERAPIST

## 2024-06-30 PROCEDURE — 25810000003 SODIUM CHLORIDE 0.9 % SOLUTION: Performed by: INTERNAL MEDICINE

## 2024-06-30 RX ADMIN — OXYCODONE HYDROCHLORIDE 5 MG: 5 TABLET ORAL at 05:34

## 2024-06-30 RX ADMIN — SODIUM CHLORIDE 100 ML/HR: 9 INJECTION, SOLUTION INTRAVENOUS at 05:34

## 2024-06-30 RX ADMIN — ACETAMINOPHEN 650 MG: 325 TABLET ORAL at 07:51

## 2024-06-30 RX ADMIN — SENNOSIDES AND DOCUSATE SODIUM 2 TABLET: 50; 8.6 TABLET ORAL at 07:51

## 2024-06-30 RX ADMIN — OXYCODONE HYDROCHLORIDE 5 MG: 5 TABLET ORAL at 00:15

## 2024-06-30 NOTE — THERAPY TREATMENT NOTE
Patient Name: George Pickard  : 1955    MRN: [id number]                              Today's Date: 2024       Admit Date: 2024    Visit Dx:     ICD-10-CM ICD-9-CM   1. Status post reverse total replacement of right shoulder  Z96.611 V43.61     Patient Active Problem List   Diagnosis    Rotator cuff tear    Right shoulder pain    HTN (hypertension)    Hyperlipidemia    Status post reverse total replacement of right shoulder     Past Medical History:   Diagnosis Date    Arthritis     Hyperlipidemia     Hypertension     ROGE (obstructive sleep apnea)     does not use cpap     Past Surgical History:   Procedure Laterality Date    COLECTOMY PARTIAL / TOTAL      COLONOSCOPY      ENDOSCOPY      ROTATOR CUFF REPAIR Bilateral     R-, L-      General Information       Row Name 24 1046          OT Time and Intention    Document Type therapy note (daily note)  -AR     Mode of Treatment individual therapy;occupational therapy  -AR       Row Name 24 1046          General Information    Existing Precautions/Restrictions right;shoulder;non-weight bearing;other (see comments);left;fall  interscalene nerve catheter, [place] II with pillow  -AR       Row Name 24 1046          Cognition    Orientation Status (Cognition) oriented x 4  -AR               User Key  (r) = Recorded By, (t) = Taken By, (c) = Cosigned By      Initials Name Provider Type    AR Tete Bone, MARTIN Occupational Therapist                     Mobility/ADL's       Row Name 24 1046          Bed Mobility    Scooting/Bridging [place] (Bed Mobility) independent  -AR     Supine-Sit [place] (Bed Mobility) modified independence  -AR     Comment, (Bed Mobility) OT educated pt on importance of maintaining NWB and safe sleeping position.  -AR       Row Name 24 1046          Transfers    Transfers sit-stand transfer;stand-sit transfer;bed-chair transfer  -AR     Comment, (Transfers) Educated pt on  importance of attending to interscalene nerve catheter to avoid dislodgement.  -AR       Row Name 06/30/24 1046          Bed-Chair Transfer    Bed-Chair [place] (Transfers) supervision;verbal cues  -AR       Row Name 06/30/24 1046          Sit-Stand Transfer    Sit-Stand [place] (Transfers) supervision;verbal cues  -AR       Row Name 06/30/24 1046          Stand-Sit Transfer    Stand-Sit [place] (Transfers) supervision;verbal cues  -AR       Row Name 06/30/24 1046          Functional Mobility    Functional Mobility- Ind. Level supervision required  -AR     Functional Mobility-Distance (Feet) 900  -AR       Row Name 06/30/24 1046          Activities of Daily Living    BADL Assessment/Intervention upper body dressing;bathing;feeding  -AR       Row Name 06/30/24 1046          Mobility    Extremity Weight-bearing Status right upper extremity  -AR     Right Upper Extremity (Weight-bearing Status) non weight-bearing (NWB)  -AR       Row Name 06/30/24 1046          Bathing Assessment/Intervention    [place] Level (Bathing) upper body;maximum assist (25% patient effort)  -AR     Position (Bathing) edge of bed sitting  -AR     Comment, (Bathing) Educated pt on shoulder precautions and axilla care to maintain, reviewed that nerve catheter cannot get wet.  -AR       Row Name 06/30/24 1046          Upper Body Dressing Assessment/Training    [place] Level (Upper Body Dressing) front opening garment;doff;don;pull-over garment;moderate assist (50% patient effort)  mod assist sling  -AR     Position (Upper Body Dressing) edge of bed sitting  -AR     Comment, (Upper Body Dressing) Educated pt on shoulder precautions, ADL retraining to maintain, sling management and care of interscalene nerve catheter during ADLs to avoid dislodgement. Pt able to verbalize correct sling application and fit without issue. No family at bedside, however teaching completed prior visits.  -AR       Row Name 06/30/24 1046           Self-Feeding Assessment/Training    [place] Level (Feeding) liquids to mouth;supervision;set up  -AR     Position (Self-Feeding) supported sitting  -AR               User Key  (r) = Recorded By, (t) = Taken By, (c) = Cosigned By      Initials Name Provider Type    Tete Jc OT Occupational Therapist                   Obj/Interventions       Row Name 06/30/24 1048          Sensory Assessment (Somatosensory)    Sensory Assessment (Somatosensory) UE sensation intact  -AR       Row Name 06/30/24 1048          Elbow/Forearm (Therapeutic Exercise)    Elbow/Forearm AROM (Therapeutic Exercise) right;flexion;extension;supination;pronation;sitting;10 repetitions  -AR       Row Name 06/30/24 1048          Wrist (Therapeutic Exercise)    Wrist (Therapeutic Exercise) AROM (active range of motion)  -AR     Wrist AROM (Therapeutic Exercise) right;flexion;extension;10 repetitions  -AR       Row Name 06/30/24 1048          Hand (Therapeutic Exercise)    Hand AROM/AAROM (Therapeutic Exercise) right;AROM (active range of motion);finger extension;finger flexion;10 repetitions  -AR       Row Name 06/30/24 1048          Motor Skills    Therapeutic Exercise elbow/forearm;wrist;hand  -AR       Row Name 06/30/24 1048          Balance    Static Sitting Balance independent  -AR     Dynamic Sitting Balance independent  -AR     Position, Sitting Balance unsupported;sitting edge of bed  -AR     Static Standing Balance independent  -AR     Dynamic Standing Balance independent  -AR     Position/Device Used, Standing Balance unsupported  -AR               User Key  (r) = Recorded By, (t) = Taken By, (c) = Cosigned By      Initials Name Provider Type    Tete Jc OT Occupational Therapist                   Goals/Plan       Row Name 06/30/24 1052          Transfer Goal 1 (OT)    Progress/Outcome (Transfer Goal 1, OT) goal ongoing  -AR       [facility] Name 06/30/24 1052          Dressing Goal 1 (OT)    Progress/Outcome  (Dressing Goal 1, OT) goal met  -AR       Row Name 06/30/24 1052          ROM Goal 1 (OT)    Progress/Outcome (ROM Goal 1, OT) goal met  -AR               User Key  (r) = Recorded By, (t) = Taken By, (c) = Cosigned By      Initials Name Provider Type    Tete Jc, OT Occupational Therapist                   Clinical Impression       Row Name 06/30/24 1049          Pain Assessment    Pretreatment Pain Rating 0/10 - no pain  -AR     Posttreatment Pain Rating 0/10 - no pain  -AR       Row Name 06/30/24 1049          Plan of Care Review    Plan of Care Reviewed With patient  -AR     Progress improving  -AR     Outcome Evaluation Pt completed bed mobilty with modified independence, completed HEP within physician parameters with supervision and required mod-max assist UB ADLS. He ambulated 900 feet with supervision with stable vitals. No c/o pain end of session. Recommend DC home with family assist.  -AR       [facility] Name 06/30/24 1049          Therapy Plan Review/Discharge Plan (OT)    Anticipated Discharge Disposition (OT) home with assist  -AR       [facility] Name 06/30/24 1049          Vital Signs    Intra Systolic BP Rehab 123  -AR     Intra Treatment Diastolic BP 69  -AR     Post Systolic BP Rehab 124  -AR     Post Treatment Diastolic BP 67  -AR     Pre SpO2 (%) 95  -AR     O2 Delivery Pre Treatment room air  -AR     Post SpO2 (%) 95  -AR     O2 Delivery Post Treatment room air  -AR     Pre Patient Position Supine  -AR     Intra Patient Position Standing  -AR     Post Patient Position Sitting  -AR       [facility] Name 06/30/24 1049          Positioning and Restraints    Pre-Treatment Position in bed  -AR     Post Treatment Position chair  -AR     In Chair notified nsg;sitting;call light within reach;encouraged to call for assist;exit alarm on;with brace  pt wearing cold pack upon entry, requested break from it  -AR               User Key  (r) = Recorded By, (t) = Taken By, (c) = Cosigned By      Initials Name Provider  Type    Tete Jc, MARTIN Occupational Therapist                   Outcome Measures       Row Name 06/30/24 1053          How much help from another is currently needed...    Putting on and taking off regular lower body clothing? 3  -AR     Bathing (including washing, rinsing, and drying) 3  -AR     Toileting (which includes using toilet bed pan or urinal) 3  -AR     Putting on and taking off regular upper body clothing 2  -AR     Taking care of personal grooming (such as brushing teeth) 3  -AR     Eating meals 3  -AR     AM-PAC 6 Clicks Score (OT) 17  -AR       Row Name 06/30/24 0800          How much help from another person do you currently need...    Turning from your back to your side while in flat bed without using bedrails? 3  -AP     Moving from lying on back to sitting on the side of a flat bed without bedrails? 3  -AP     Moving to and from a bed to a chair (including a wheelchair)? 3  -AP     Standing up from a chair using your arms (e.g., wheelchair, bedside chair)? 3  -AP     Climbing 3-5 steps with a railing? 3  -AP     To walk in hospital room? 3  -AP     AM-PAC 6 Clicks Score (PT) 18  -AP     Highest Level of Mobility Goal 6 --> Walk 10 steps or more  -AP       Row Name 06/30/24 1053          Functional Assessment    Outcome Measure Options AM-PAC 6 Clicks Daily Activity (OT)  -AR               User Key  (r) = Recorded By, (t) = Taken By, (c) = Cosigned By      Initials Name Provider Type    Tete Jc OT Occupational Therapist    Sherry Sotomayor, RN Registered Nurse                    Occupational Therapy Education       Title: PT OT SLP Therapies (Done)       Topic: Occupational Therapy (Done)       Point: ADL training (Done)       Description:   Instruct learner(s) on proper safety adaptation and remediation techniques during self care or transfers.   Instruct in proper use of assistive devices.                  Learning Progress Summary             Patient [place], E,H,  VU,DU by AR at 6/30/2024 1053    Eager, TB,E,D,H, VU,DU by AR at 6/29/2024 1409    Acceptance, E,D, VU,NR by TB at 6/28/2024 1546   Family Eager, TB,E,D,H, VU,DU by AR at 6/29/2024 1409                         Point: Home exercise program (Done)       Description:   Instruct learner(s) on appropriate technique for monitoring, assisting and/or progressing therapeutic exercises/activities.                  Learning Progress Summary             Patient Eager, E,H, VU,DU by AR at 6/30/2024 1053    Eager, TB,E,D,H, VU,DU by AR at 6/29/2024 1409    Acceptance, E,D, VU,NR by TB at 6/28/2024 1546   Family Eager, TB,E,D,H, VU,DU by AR at 6/29/2024 1409                         Point: Precautions (Done)       Description:   Instruct learner(s) on prescribed precautions during self-care and functional transfers.                  Learning Progress Summary             Patient Eager, E,H, VU,DU by AR at 6/30/2024 1053    Eager, TB,E,D,H, VU,DU by AR at 6/29/2024 1409    Acceptance, E,D, VU,NR by TB at 6/28/2024 1546   Family Eager, TB,E,D,H, VU,DU by AR at 6/29/2024 1409                         Point: Body mechanics (Done)       Description:   Instruct learner(s) on proper positioning and spine alignment during self-care, functional mobility activities and/or exercises.                  Learning Progress Summary             Patient Eager, E,H, VU,DU by AR at 6/30/2024 1053    Eager, TB,E,D,H, VU,DU by AR at 6/29/2024 1409   Family Eager, TB,E,D,H, VU,DU by AR at 6/29/2024 1409                                         User Key       Initials Effective Dates Name Provider Type Discipline    TB 07/11/23 -  Diana Delarosa, OT Occupational Therapist OT    AR 07/11/23 -  Tete Bone, OT Occupational Therapist OT                  OT Recommendation and Plan     Plan of Care Review  Plan of Care Reviewed With: patient  Progress: improving  Outcome Evaluation: Pt completed bed mobilty with modified independence, completed HEP  within physician parameters with supervision and required mod-max assist UB ADLS. He ambulated 900 feet with supervision with stable vitals. No c/o pain end of session. Recommend DC home with family assist.     Time Calculation:         Time Calculation- OT       Row Name 06/30/24 1054             Time Calculation- OT    OT Start Time 1001  -AR      OT Received On 06/30/24  -AR      OT Goal Re-Cert Due Date 07/08/24  -AR         Timed Charges    80290 - OT Therapeutic Exercise Minutes 8  -AR      99866 - OT Therapeutic Activity Minutes 11  -AR      72834 - OT Self Care/Mgmt Minutes 20  -AR         Total Minutes    Timed Charges Total Minutes 39  -AR       Total Minutes 39  -AR                User Key  (r) = Recorded By, (t) = Taken By, (c) = Cosigned By      Initials Name Provider Type    Tete Jc OT Occupational Therapist                  Therapy Charges for Today       Code Description Service Date Service Provider Modifiers Qty    77672154884 HC OT SELF CARE/MGMT/TRAIN EA 15 MIN 6/29/2024 Tete Bone OT GO 3    80734170196 HC OT THER PROC EA 15 MIN 6/29/2024 Tete Bone OT GO 1    27961121160 HC OT SELF CARE/MGMT/TRAIN EA 15 MIN 6/30/2024 Tete Bone OT GO 1    69279688219 HC OT THERAPEUTIC ACT EA 15 MIN 6/30/2024 Tete Bone OT GO 1    88558594855 HC OT THER PROC EA 15 MIN 6/30/2024 Tete Bone OT GO 1                 Tete Bone OT  6/30/2024

## 2024-06-30 NOTE — DISCHARGE SUMMARY
Patient Name: George Pickard  MRN: [id number]  : 1955  DOS: 2024    Attending: No att. providers found    Primary Care Provider: Daniel Elkins APRN    Date of Admission:.2024  7:06 AM    Date of Discharge:  2024    Discharge Diagnosis:   Status post reverse total replacement of right shoulder    Rotator cuff tear    Right shoulder pain    HTN (hypertension)    Hyperlipidemia      Hospital Course    At admit:  Patient is a pleasant 68 y.o. male presented for scheduled surgery by Dr. Cobb.      His shoulder has been painful with limited ROM since March. He states no specific injury, but remembers having pain that radiates to his elbow that lasted more than a month. He had previous right shoulder surgery in . He denies numbness, tingling or difficulty with  to right hand.   He failed conservative management and opted to proceed with surgery.     Today he  underwent  right reverse total shoulder arthroplasty under GA and a block, tolerated surgery well, was admitted for further management.     Seen in his room postoperatively, doing fairly well, no complains of nausea, vomiting, or shortness of breath.    After admit:  Patient was provided pain medications as needed for pain control, along with interscalene nerve block infusion of Ropivacaine    Adjustments were made to pain medications to optimize postop pain management.   Risks and benefits of opiate medications discussed with patient. OMKAR report on chart was reviewed.    The patient was seen by OT and was taught exercises for right arm.  Patient had an orthostatic hypotension episode while working with OT on postop day 1.  He received IV fluids, lisinopril was held.  Symptoms resolved with no recurrence.  I have advised him to continue holding his lisinopril at home following discharge for couple of days and recheck his blood pressure before resuming to ensure its high enough.    The patient used an IS for atelectasis  "prophylaxis and mechanicals for DVT prophylaxis.    Home medications were resumed as appropriate, and labs were monitored and remained fairly stable.     With the progress he has made, Mr. Pickard is ready for DC home today.      The patient will have an Infupump ( instructed on it during this admit)  Discussed with patient regarding plan and he shows understanding and agreement.        Procedures Performed  Procedure(s):  REVERSE TOTAL SHOULDER ARTHROPLASTY - RIGHT       Pertinent Test Results:    I reviewed the patient's new clinical results.   Results from last 7 days   Lab Units 24  0902 24  0418   WBC 10*3/mm3 11.94* 18.21*   HEMOGLOBIN g/dL 12.3* 12.4*   HEMATOCRIT % 37.5 37.3*   PLATELETS 10*3/mm3 216 216     Results from last 7 days   Lab Units 248   SODIUM mmol/L 134*   POTASSIUM mmol/L 4.4   CHLORIDE mmol/L 102   CO2 mmol/L 23.0   BUN mg/dL 17   CREATININE mg/dL 0.80   CALCIUM mg/dL 8.7   GLUCOSE mg/dL 121*     I reviewed the patient's new imaging including images and reports.      Occupational Therapy  Date of Service: 24  Creation Time: 24     Signed         Goal Outcome Evaluation:  Plan of Care Reviewed With: patient  Progress: improving  Outcome Evaluation: Pt completed bed mobilty with modified independence, completed HEP within physician parameters with supervision and required mod-max assist UB ADLS. He ambulated 900 feet with supervision with stable vitals. No c/o pain end of session. Recommend DC home with family assist.        Anticipated Discharge Disposition (OT): home with assist                      Discharge Assessment:       Visit Vitals  /66 (BP Location: Left arm, Patient Position: Sitting)   Pulse 82   Temp 98 °F (36.7 °C) (Oral)   Resp 17   Ht 177.8 cm (70\")   Wt 97.1 kg (214 lb)   SpO2 95%   BMI 30.71 kg/m²     Temp (24hrs), Av.3 °F (36.8 °C), Min:98 °F (36.7 °C), Max:98.8 °F (37.1 °C)      General Appearance:    Alert, " cooperative, in no acute distress   Lungs:     Clear to auscultation,respirations regular, even and   unlabored    Heart:    Regular rhythm and normal rate, normal S1 and S2    Abdomen:     Normal bowel sounds, no masses, no organomegaly, soft        non-tender, non-distended, no guarding, no rebound                 tenderness   Extremities: Right UE in a sling, CDI  dressing over right shoulder incision . Interscalene nerve block cath present.  Distal pulses, cap refill,  intact. Movement and sensation intact distally     Pulses:   Pulses palpable and equal bilaterally   Skin:   No bleeding, bruising or rash        Discharge Disposition: Home          Discharge Medications        New Medications        Instructions Start Date   docusate sodium 100 MG capsule  Commonly known as: COLACE   100 mg, Oral, 2 Times Daily      oxyCODONE 5 MG immediate release tablet  Commonly known as: Roxicodone   5 mg, Oral, Every 4 Hours PRN      ropivacaine 0.2 % infusion (INFUSYSTEM)  Commonly known as: NAROPIN   1 mL/hr (2 mg/hr), Peripheral Nerve, Continuous             Continue These Medications        Instructions Start Date   albuterol sulfate  (90 Base) MCG/ACT inhaler  Commonly known as: PROVENTIL HFA;VENTOLIN HFA;PROAIR HFA   2 puffs, Inhalation, Every 4 Hours PRN      aspirin 81 MG EC tablet   Oral      atorvastatin 10 MG tablet  Commonly known as: LIPITOR   10 mg, Oral, Daily      Diclofenac Sodium 1 % gel gel  Commonly known as: VOLTAREN   2 g, Topical, 3 Times Daily      lisinopril 20 MG tablet  Commonly known as: PRINIVIL,ZESTRIL   20 mg, Oral, Daily      multivitamin with minerals tablet tablet   1 tablet, Oral, Daily      ondansetron 4 MG tablet  Commonly known as: ZOFRAN   Take 1 tablet by mouth Every 8 (Eight) Hours As Needed for post-op nausea             Stop These Medications      benzoyl peroxide 5 % external wash  Generic drug: benzoyl peroxide     mupirocin 2 % ointment  Commonly known as: BACTROBAN           Lisinopril will be held as per discussion with patient for a few days with blood pressure recheck before resuming    Discharge Diet:   Diet Instructions    Regular            Activity at Discharge:   Activity Instructions    Non-weightbearing right upper arm, range of motion hand, wrist, elbow per OT recommendations.     OK to shower once nerve block comes out    Your dressing is waterproof and ok to shower with    Please keep your dressing in place until your follow up with Dr. Cobb              Follow-up Appointments:  Jason Cobb MD per his orders       Mindi Baeza MD  06/30/24  13:05 EDT

## 2024-06-30 NOTE — PROGRESS NOTES
"IM progress note      George Pickard  [id number]  1955     LOS: 0 days     Attending: Jason Cobb MD    Primary Care Provider: Daniel Elkins APRN    Late entry for visit on 2024  Chief Complaint/Reason for visit:  No chief complaint on file.      Subjective   Patient was doing well when seen in the a.m., good pain control, no complaints of nausea, vomiting, or shortness of breath.  Awaiting OT session with the possibility of discharge home later in the day.    Objective        Visit Vitals  /63 (Patient Position: Standing)   Pulse 80   Temp 98.1 °F (36.7 °C) (Oral)   Resp 17   Ht 177.8 cm (70\")   Wt 97.1 kg (214 lb)   SpO2 93%   BMI 30.71 kg/m²     Temp (24hrs), Av.3 °F (36.8 °C), Min:97.9 °F (36.6 °C), Max:98.8 °F (37.1 °C)      Intake/Output:    Intake/Output Summary (Last 24 hours) at 2024 0746  Last data filed at 2024 0538  Gross per 24 hour   Intake 1877.67 ml   Output 3400 ml   Net -1522.33 ml        Occupational Therapy    Goal Outcome Evaluation:  Plan of Care Reviewed With: patient, sibling  Progress: declining  Outcome Evaluation: OT educated pt and family on shoulder precautions, ADL retraining to maintain, sling management and HEP. Pt reports 8/10 pain R anterior shoulder and no relief with use of PCA feature on block or oral pain medication. RN notified APS requesting block be assessed. Pt with limited carry-over of teaching, however brother at bedside with appropriate recall and additional family will be assisting. Pt and brother managed sling and HEP with supervision. During session, pt sitting EOB level and reported feeling light headed, followed by episode of decreased alertness/responsiveness. BP 59/45, pt was assisted to supine level and RN and charge nurse at bedside to assess. Deferred mobility d/t medical status.        Anticipated Discharge Disposition (OT): home with 24/ care        Physical Exam:     General Appearance:    Alert, " cooperative, in no acute distress   Head:    Normocephalic, without obvious abnormality, atraumatic    Lungs:     Normal effort, symmetric chest rise,  clear to      auscultation bilaterally              Heart:    Regular rhythm and normal rate, normal S1 and S2    Abdomen:     Normal bowel sounds, no masses, no organomegaly, soft        non-tender, non-distended, no guarding, no rebound                tenderness   Extremities: Clean dry and intact dressing over shoulder incision.  Right upper extremity in a sling.  Moves hand and wrist well.  No clubbing, cyanosis or edema.  No deformities.    Pulses:   Pulses palpable and equal bilaterally   Skin:   No bleeding, bruising or rash          Results Review:     I reviewed the patient's new clinical results.   Results from last 7 days   Lab Units 06/29/24  0418   WBC 10*3/mm3 18.21*   HEMOGLOBIN g/dL 12.4*   HEMATOCRIT % 37.3*   PLATELETS 10*3/mm3 216     Results from last 7 days   Lab Units 06/29/24  0418   SODIUM mmol/L 134*   POTASSIUM mmol/L 4.4   CHLORIDE mmol/L 102   CO2 mmol/L 23.0   BUN mg/dL 17   CREATININE mg/dL 0.80   CALCIUM mg/dL 8.7   GLUCOSE mg/dL 121*     I reviewed the patient's new imaging including images and reports.    All medications reviewed.   atorvastatin, 10 mg, Oral, Nightly  senna-docusate sodium, 2 tablet, Oral, BID      acetaminophen, 650 mg, Q4H PRN   Or  acetaminophen, 650 mg, Q4H PRN  albuterol, 2.5 mg, Q6H PRN  polyethylene glycol, 17 g, Daily PRN   And  bisacodyl, 5 mg, Daily PRN   And  bisacodyl, 10 mg, Daily PRN  HYDROmorphone, 0.5 mg, Q2H PRN   And  naloxone, 0.1 mg, Q5 Min PRN  labetalol, 10 mg, Q4H PRN  melatonin, 5 mg, Nightly PRN  ondansetron ODT, 4 mg, Q6H PRN   Or  ondansetron, 4 mg, Q6H PRN  oxyCODONE, 5 mg, Q4H PRN  sodium chloride, 500 mL, TID PRN        Assessment & Plan       Status post reverse total replacement of right shoulder    Rotator cuff tear    Right shoulder pain    HTN (hypertension)     Hyperlipidemia  Postoperative pain  Orthostatic hypotension     Plan  initial plan was to discharge patient home later in the day after being cleared by OT.  Having had sudden drop in blood pressure that was symptomatic while working with OT it was felt best that he be observed overnight, received bolus of IV fluids and infusion of normal saline, antihypertensives placed on hold.     1. PT/OT. NWB, right UE, ROM hand, wrist, elbow.  2. Pain control-prns, interscalene nerve block cath with ropivacaine infusion.           3. IS-encourage  4. DVT proph-Mech/ mobilize.  5. Bowel regimen  6. Resumed home medications as appropriate        - Hypertension:  Monitor off lisinopril.  Prn medications for elevated blood pressure.     -Dyslipidemia:  Resume home regimen statin ( formulary substitution when appropriate).     -ROGE: Does not use CPAP.  Will monitor oxygenation and encourage use of incentive spirometer.    Mindi Baeza MD  06/30/24  07:46 EDT

## 2024-06-30 NOTE — PLAN OF CARE
Problem: Adult Inpatient Plan of Care  Goal: Plan of Care Review  Outcome: Ongoing, Progressing  Goal: Patient-Specific Goal (Individualized)  Outcome: Ongoing, Progressing  Goal: Absence of Hospital-Acquired Illness or Injury  Outcome: Ongoing, Progressing  Intervention: Identify and Manage Fall Risk  Recent Flowsheet Documentation  Taken 6/30/2024 0400 by Tatum Patel RN  Safety Promotion/Fall Prevention: safety round/check completed  Taken 6/30/2024 0200 by Tatum Patel RN  Safety Promotion/Fall Prevention: safety round/check completed  Taken 6/30/2024 0000 by Tatum Patel RN  Safety Promotion/Fall Prevention: safety round/check completed  Taken 6/29/2024 2200 by Tatum Patel RN  Safety Promotion/Fall Prevention: safety round/check completed  Taken 6/29/2024 2000 by Tatum Patel RN  Safety Promotion/Fall Prevention:   activity supervised   assistive device/personal items within reach   clutter free environment maintained   elopement precautions   fall prevention program maintained   gait belt   lighting adjusted   mobility aid in reach   muscle strengthening facilitated   nonskid shoes/slippers when out of bed   room organization consistent   safety round/check completed   toileting scheduled  Intervention: Prevent Skin Injury  Recent Flowsheet Documentation  Taken 6/30/2024 0400 by Tatum Patel RN  Body Position: position changed independently  Taken 6/30/2024 0200 by Tatum Patel RN  Body Position: position changed independently  Taken 6/30/2024 0000 by Tatum Patel RN  Body Position: position changed independently  Taken 6/29/2024 2200 by Tatum Patel RN  Body Position: position changed independently  Taken 6/29/2024 2000 by Tatum Patel RN  Body Position: position changed independently  Skin Protection:   adhesive use limited   transparent dressing maintained   tubing/devices free from skin contact  Intervention: Prevent and Manage VTE (Venous Thromboembolism)  Risk  Recent Flowsheet Documentation  Taken 6/29/2024 2200 by Tatum Patel RN  Activity Management: ambulated to bathroom  Taken 6/29/2024 2000 by Tatum Patel RN  Activity Management: activity encouraged  VTE Prevention/Management:   bilateral   sequential compression devices on  Range of Motion: active ROM (range of motion) encouraged  Intervention: Prevent Infection  Recent Flowsheet Documentation  Taken 6/30/2024 0400 by Tatum Patel RN  Infection Prevention: rest/sleep promoted  Taken 6/30/2024 0200 by Tatum Patel RN  Infection Prevention: rest/sleep promoted  Taken 6/30/2024 0000 by Tatum Patel RN  Infection Prevention: rest/sleep promoted  Taken 6/29/2024 2200 by Tatum Patel RN  Infection Prevention: rest/sleep promoted  Taken 6/29/2024 2000 by Tatum Patel RN  Infection Prevention:   cohorting utilized   environmental surveillance performed   equipment surfaces disinfected   hand hygiene promoted   personal protective equipment utilized   rest/sleep promoted   single patient room provided  Goal: Optimal Comfort and Wellbeing  Outcome: Ongoing, Progressing  Intervention: Monitor Pain and Promote Comfort  Recent Flowsheet Documentation  Taken 6/29/2024 2000 by Tatum Patel RN  Pain Management Interventions: see MAR  Intervention: Provide Person-Centered Care  Recent Flowsheet Documentation  Taken 6/30/2024 0400 by Tatum Patel RN  Trust Relationship/Rapport:   care explained   choices provided   emotional support provided   empathic listening provided   questions answered   questions encouraged   reassurance provided   thoughts/feelings acknowledged  Taken 6/30/2024 0200 by Tatum Patel RN  Trust Relationship/Rapport:   care explained   choices provided   emotional support provided   empathic listening provided   questions encouraged   questions answered   reassurance provided   thoughts/feelings acknowledged  Taken 6/30/2024 0000 by Tatum Patel RN  Trust  Relationship/Rapport:   care explained   choices provided   emotional support provided   empathic listening provided   questions answered   questions encouraged   reassurance provided   thoughts/feelings acknowledged  Taken 6/29/2024 2200 by Tatum Patel RN  Trust Relationship/Rapport:   care explained   choices provided   emotional support provided   empathic listening provided   questions answered   questions encouraged   reassurance provided   thoughts/feelings acknowledged  Taken 6/29/2024 2000 by Tatum Patel RN  Trust Relationship/Rapport:   care explained   choices provided   emotional support provided   empathic listening provided   questions answered   questions encouraged   reassurance provided   thoughts/feelings acknowledged  Goal: Readiness for Transition of Care  Outcome: Ongoing, Progressing     Problem: Adjustment to Surgery (Shoulder Arthroplasty)  Goal: Optimal Coping  Outcome: Ongoing, Progressing  Intervention: Support Psychosocial Response to Surgery and Mobility Changes  Recent Flowsheet Documentation  Taken 6/29/2024 2000 by Tatum Patel RN  Supportive Measures: active listening utilized     Problem: Bleeding (Shoulder Arthroplasty)  Goal: Absence of Bleeding  Outcome: Ongoing, Progressing  Intervention: Monitor and Manage Bleeding  Recent Flowsheet Documentation  Taken 6/29/2024 2000 by Tatum Patel RN  Bleeding Management: dressing monitored     Problem: Bowel Motility Impaired (Shoulder Arthroplasty)  Goal: Effective Bowel Elimination  Outcome: Ongoing, Progressing  Intervention: Enhance Bowel Motility and Elimination  Recent Flowsheet Documentation  Taken 6/29/2024 2000 by Tatum Patel RN  Bowel Elimination Management:   hygiene measures promoted   relaxation techniques promoted   toileting offered  Bowel Motility Enhancement:   ambulation promoted   fluid intake encouraged   oral intake encouraged     Problem: Fluid and Electrolyte Imbalance (Shoulder  Arthroplasty)  Goal: Fluid and Electrolyte Balance  Outcome: Ongoing, Progressing  Intervention: Monitor and Manage Fluid and Electrolyte Balance  Recent Flowsheet Documentation  Taken 6/29/2024 2000 by Tatum Patel RN  Fluid/Electrolyte Management: fluids provided     Problem: Functional Ability Impaired (Shoulder Arthroplasty)  Goal: Optimal Functional Ability  Outcome: Ongoing, Progressing  Intervention: Promote Optimal Functional Status  Recent Flowsheet Documentation  Taken 6/29/2024 2200 by Tatum Patel RN  Activity Management: ambulated to bathroom  Taken 6/29/2024 2000 by Tatum Patel RN  Activity Management: activity encouraged  Self-Care Promotion:   independence encouraged   BADL personal objects within reach   BADL personal routines maintained   meal set-up provided     Problem: Infection (Shoulder Arthroplasty)  Goal: Absence of Infection Signs and Symptoms  Outcome: Ongoing, Progressing  Intervention: Prevent or Manage Infection  Recent Flowsheet Documentation  Taken 6/29/2024 2000 by Tatum Patel RN  Infection Management: aseptic technique maintained     Problem: Neurovascular Compromise (Shoulder Arthroplasty)  Goal: Intact Neurovascular Status  Outcome: Ongoing, Progressing  Intervention: Prevent or Manage Neurovascular Compromise  Recent Flowsheet Documentation  Taken 6/29/2024 2000 by Tatum Patel RN  Compartment Syndrome Surveillance: no pain with passive muscle stretch     Problem: Ongoing Anesthesia Effects (Shoulder Arthroplasty)  Goal: Anesthesia/Sedation Recovery  Outcome: Ongoing, Progressing  Intervention: Optimize Anesthesia Recovery  Recent Flowsheet Documentation  Taken 6/30/2024 0400 by Tatum Patel RN  Safety Promotion/Fall Prevention: safety round/check completed  Taken 6/30/2024 0200 by Tatum Patel RN  Safety Promotion/Fall Prevention: safety round/check completed  Taken 6/30/2024 0000 by Tatum Patel RN  Safety Promotion/Fall Prevention: safety  round/check completed  Taken 6/29/2024 2200 by Tatum Patel RN  Safety Promotion/Fall Prevention: safety round/check completed  Taken 6/29/2024 2000 by Tatum Patel RN  Patient Tolerance (IS): good  Safety Promotion/Fall Prevention:   activity supervised   assistive device/personal items within reach   clutter free environment maintained   elopement precautions   fall prevention program maintained   gait belt   lighting adjusted   mobility aid in reach   muscle strengthening facilitated   nonskid shoes/slippers when out of bed   room organization consistent   safety round/check completed   toileting scheduled  Administration (IS): self-administered  Reorientation Measures:   calendar in view   clock in view  Level Incentive Spirometer (mL): 2000  Incentive Spirometer Predicted Level (mL): 2000  Number of Repetitions (IS): 10     Problem: Pain (Shoulder Arthroplasty)  Goal: Acceptable Pain Control  Outcome: Ongoing, Progressing  Intervention: Prevent or Manage Pain  Recent Flowsheet Documentation  Taken 6/30/2024 0400 by Tatum Patel RN  Diversional Activities: television  Taken 6/30/2024 0200 by Tatum Patel RN  Diversional Activities: television  Taken 6/30/2024 0000 by Tatum Patel RN  Diversional Activities: television  Taken 6/29/2024 2200 by Tatum Patel RN  Diversional Activities: television  Taken 6/29/2024 2000 by Tatum Patel RN  Pain Management Interventions: see MAR  Diversional Activities: television     Problem: Postoperative Nausea and Vomiting (Shoulder Arthroplasty)  Goal: Nausea and Vomiting Relief  Outcome: Ongoing, Progressing     Problem: Postoperative Urinary Retention (Shoulder Arthroplasty)  Goal: Effective Urinary Elimination  Outcome: Ongoing, Progressing  Intervention: Monitor and Manage Urinary Retention  Recent Flowsheet Documentation  Taken 6/29/2024 2000 by Tatum Patel RN  Urinary Elimination Promotion: toileting offered     Problem: Fall Injury  Risk  Goal: Absence of Fall and Fall-Related Injury  Outcome: Ongoing, Progressing  Intervention: Identify and Manage Contributors  Recent Flowsheet Documentation  Taken 6/30/2024 0400 by Tatum Patel RN  Medication Review/Management: medications reviewed  Taken 6/30/2024 0200 by Tatum Patel RN  Medication Review/Management: medications reviewed  Taken 6/30/2024 0000 by Tatum Patel RN  Medication Review/Management: medications reviewed  Taken 6/29/2024 2200 by Tatum Patel RN  Medication Review/Management: medications reviewed  Taken 6/29/2024 2000 by Tatum Patel RN  Medication Review/Management: medications reviewed  Self-Care Promotion:   independence encouraged   BADL personal objects within reach   BADL personal routines maintained   meal set-up provided  Intervention: Promote Injury-Free Environment  Recent Flowsheet Documentation  Taken 6/30/2024 0400 by Tatum Patel RN  Safety Promotion/Fall Prevention: safety round/check completed  Taken 6/30/2024 0200 by Tatum Patel RN  Safety Promotion/Fall Prevention: safety round/check completed  Taken 6/30/2024 0000 by Tatum Patel RN  Safety Promotion/Fall Prevention: safety round/check completed  Taken 6/29/2024 2200 by Tatum Patel RN  Safety Promotion/Fall Prevention: safety round/check completed  Taken 6/29/2024 2000 by Tatum Patel RN  Safety Promotion/Fall Prevention:   activity supervised   assistive device/personal items within reach   clutter free environment maintained   elopement precautions   fall prevention program maintained   gait belt   lighting adjusted   mobility aid in reach   muscle strengthening facilitated   nonskid shoes/slippers when out of bed   room organization consistent   safety round/check completed   toileting scheduled   Goal Outcome Evaluation:

## 2024-06-30 NOTE — PROGRESS NOTES
[facility]    Acute pain service Inpatient Progress Note    Patient Name: George Pickard  :  1955  MRN:  [id number]        Acute Pain  Service Inpatient Progress Note:    Analgesia:Excellent  Pain Score:2/10  LOC: alert and awake  Resp Status: room air  Cardiac: VS stable  Side Effects:None  Catheter Site:clean, dressing intact and dry  Cath type: peripheral nerve cath with ON Q  Volume: 1mL,5ml, 5ml InfuSystem Pump.  Catheter Plan:Catheter to remain Insitu and Continue catheter infusion rate unchanged

## 2024-06-30 NOTE — PLAN OF CARE
Goal Outcome Evaluation:  Plan of Care Reviewed With: patient        Progress: improving  Outcome Evaluation: Pt completed bed mobilty with modified independence, completed HEP within physician parameters with supervision and required mod-max assist UB ADLS. He ambulated 900 feet with supervision with stable vitals. No c/o pain end of session. Recommend DC home with family assist.      Anticipated Discharge Disposition (OT): home with assist

## (undated) DEVICE — SOL ISO/ALC RUB 70PCT 4OZ

## (undated) DEVICE — PULLOVER TOGA, 2X LARGE: Brand: FLYTE, SURGICOOL

## (undated) DEVICE — KT PUMP INFUBLOCK MDL 2100 PMKITSOLIS

## (undated) DEVICE — BIT DRL UNIVERS REVERS MODULAR/GLEN 3MM STRL

## (undated) DEVICE — 3M™ IOBAN™ 2 ANTIMICROBIAL INCISE DRAPE 6651EZ: Brand: IOBAN™ 2

## (undated) DEVICE — TRAP FLD MINIVAC MEGADYNE 100ML

## (undated) DEVICE — SUT VIC 2/0 CT2 27IN J269H

## (undated) DEVICE — GLV SURG SENSICARE PI ORTHO SZ7.5 LF STRL

## (undated) DEVICE — 450 ML BOTTLE OF 0.05% CHLORHEXIDINE GLUCONATE IN 99.95% STERILE WATER FOR IRRIGATION, USP AND APPLICATOR.: Brand: IRRISEPT ANTIMICROBIAL WOUND LAVAGE

## (undated) DEVICE — PK MAJ SHLDR SPLT 10

## (undated) DEVICE — SKN PREP SPNG STKS PVP PNT STR: Brand: MEDLINE INDUSTRIES, INC.

## (undated) DEVICE — SLNG ULTRSLING2 11TO13IN MD

## (undated) DEVICE — PATIENT RETURN ELECTRODE, SINGLE-USE, CONTACT QUALITY MONITORING, ADULT, WITH 9FT CORD, FOR PATIENTS WEIGING OVER 33LBS. (15KG): Brand: MEGADYNE

## (undated) DEVICE — ST PIN FIX TEMP UNIVERSREVERS W/BRKAWAY/GUIDE/PIN 2.4MM STRL

## (undated) DEVICE — UNDERGLV SURG BIOGEL INDICAT PI SZ8 BLU

## (undated) DEVICE — ANTIBACTERIAL UNDYED BRAIDED (POLYGLACTIN 910), SYNTHETIC ABSORBABLE SUTURE: Brand: COATED VICRYL

## (undated) DEVICE — STRYKER PERFORMANCE SERIES SAGITTAL BLADE: Brand: STRYKER PERFORMANCE SERIES

## (undated) DEVICE — PENCL SMOKE/EVAC MEGADYNE TELESCP 10FT

## (undated) DEVICE — INTENDED TO SUPPORT AND MAINTAIN THE POSITION OF AN ANESTHETIZED PATIENT DURING SURGERY: Brand: ERIN BEACH CHAIR FACE MASK

## (undated) DEVICE — INTENDED FOR TISSUE SEPARATION, AND OTHER PROCEDURES THAT REQUIRE A SHARP SURGICAL BLADE TO PUNCTURE OR CUT.: Brand: BARD-PARKER ® CARBON RIB-BACK BLADES

## (undated) DEVICE — SPNG GZ WOVN 4X4IN 12PLY 10/BX STRL

## (undated) DEVICE — BLANKT WARM LOWR/BDY 100X120CM